# Patient Record
Sex: FEMALE | Race: WHITE | Employment: FULL TIME | URBAN - METROPOLITAN AREA
[De-identification: names, ages, dates, MRNs, and addresses within clinical notes are randomized per-mention and may not be internally consistent; named-entity substitution may affect disease eponyms.]

---

## 2022-10-17 ENCOUNTER — HOSPITAL ENCOUNTER (EMERGENCY)
Age: 57
Discharge: HOME | End: 2022-10-17
Attending: EMERGENCY MEDICINE | Admitting: EMERGENCY MEDICINE
Payer: COMMERCIAL

## 2022-10-17 ENCOUNTER — APPOINTMENT (OUTPATIENT)
Dept: GENERAL RADIOLOGY | Age: 57
End: 2022-10-17
Payer: COMMERCIAL

## 2022-10-17 VITALS
OXYGEN SATURATION: 96 % | TEMPERATURE: 97.8 F | SYSTOLIC BLOOD PRESSURE: 125 MMHG | WEIGHT: 229 LBS | RESPIRATION RATE: 20 BRPM | DIASTOLIC BLOOD PRESSURE: 69 MMHG | HEIGHT: 66 IN | HEART RATE: 92 BPM | BODY MASS INDEX: 36.8 KG/M2

## 2022-10-17 DIAGNOSIS — R07.9 CHEST PAIN, UNSPECIFIED TYPE: ICD-10-CM

## 2022-10-17 DIAGNOSIS — G44.209 TENSION HEADACHE: ICD-10-CM

## 2022-10-17 DIAGNOSIS — J06.9 VIRAL UPPER RESPIRATORY TRACT INFECTION: Primary | ICD-10-CM

## 2022-10-17 LAB
ALBUMIN SERPL-MCNC: 4.5 G/DL (ref 3.5–5)
ALBUMIN/GLOB SERPL: 1.5 {RATIO} (ref 0.4–1.6)
ALP SERPL-CCNC: 113 U/L (ref 45–117)
ALT SERPL-CCNC: 6 U/L (ref 13–61)
ANION GAP SERPL CALC-SCNC: 9 MMOL/L (ref 2–11)
AST SERPL-CCNC: 20 U/L (ref 15–37)
BILIRUB SERPL-MCNC: 0.8 MG/DL (ref 0.2–1.1)
BUN SERPL-MCNC: 9 MG/DL (ref 7–18)
CALCIUM SERPL-MCNC: 10.1 MG/DL (ref 8.3–10.4)
CHLORIDE SERPL-SCNC: 104 MMOL/L (ref 98–107)
CO2 SERPL-SCNC: 28 MMOL/L (ref 21–32)
CREAT SERPL-MCNC: 0.57 MG/DL (ref 0.6–1)
ERYTHROCYTE [DISTWIDTH] IN BLOOD BY AUTOMATED COUNT: 12.9 % (ref 11.9–14.6)
GLOBULIN SER CALC-MCNC: 3 G/DL (ref 2.8–4.5)
GLUCOSE SERPL-MCNC: 84 MG/DL (ref 65–100)
HCT VFR BLD AUTO: 40.4 % (ref 35.8–46.3)
HGB BLD-MCNC: 13.5 G/DL (ref 11.7–15.4)
INR PPP: 1.9
MCH RBC QN AUTO: 30.7 PG (ref 26.1–32.9)
MCHC RBC AUTO-ENTMCNC: 33.4 G/DL (ref 31.4–35)
MCV RBC AUTO: 91.8 FL (ref 82–102)
NRBC # BLD: 0 K/UL (ref 0–0.2)
PLATELET # BLD AUTO: 180 K/UL (ref 150–450)
PMV BLD AUTO: 10.8 FL (ref 9.4–12.3)
POTASSIUM SERPL-SCNC: 4.3 MMOL/L (ref 3.5–5.1)
PROT SERPL-MCNC: 7.5 G/DL (ref 6.4–8.2)
PROTHROMBIN TIME: 22.6 SEC (ref 12.6–14.5)
RBC # BLD AUTO: 4.4 M/UL (ref 4.05–5.2)
SODIUM SERPL-SCNC: 141 MMOL/L (ref 133–143)
TROPONIN T SERPL HS-MCNC: 6.9 NG/L (ref 0–14)
WBC # BLD AUTO: 6.4 K/UL (ref 4.3–11.1)

## 2022-10-17 PROCEDURE — 85027 COMPLETE CBC AUTOMATED: CPT

## 2022-10-17 PROCEDURE — 80053 COMPREHEN METABOLIC PANEL: CPT

## 2022-10-17 PROCEDURE — 6370000000 HC RX 637 (ALT 250 FOR IP): Performed by: EMERGENCY MEDICINE

## 2022-10-17 PROCEDURE — 99285 EMERGENCY DEPT VISIT HI MDM: CPT | Performed by: EMERGENCY MEDICINE

## 2022-10-17 PROCEDURE — 71045 X-RAY EXAM CHEST 1 VIEW: CPT

## 2022-10-17 PROCEDURE — 85610 PROTHROMBIN TIME: CPT

## 2022-10-17 PROCEDURE — 84484 ASSAY OF TROPONIN QUANT: CPT

## 2022-10-17 RX ORDER — ACETAMINOPHEN 500 MG
1000 TABLET ORAL
Status: COMPLETED | OUTPATIENT
Start: 2022-10-17 | End: 2022-10-17

## 2022-10-17 RX ADMIN — ACETAMINOPHEN 1000 MG: 500 TABLET, FILM COATED ORAL at 21:12

## 2022-10-17 ASSESSMENT — PAIN SCALES - GENERAL: PAINLEVEL_OUTOF10: 6

## 2022-10-17 ASSESSMENT — PAIN DESCRIPTION - DESCRIPTORS
DESCRIPTORS: TIGHTNESS
DESCRIPTORS: HEAVINESS

## 2022-10-17 ASSESSMENT — PAIN - FUNCTIONAL ASSESSMENT: PAIN_FUNCTIONAL_ASSESSMENT: 0-10

## 2022-10-17 ASSESSMENT — PAIN DESCRIPTION - LOCATION
LOCATION: CHEST
LOCATION: CHEST;THROAT

## 2022-10-17 ASSESSMENT — PAIN DESCRIPTION - ORIENTATION: ORIENTATION: UPPER

## 2022-10-17 NOTE — Clinical Note
Yenny Rodríguez was seen and treated in our emergency department on 10/17/2022. She may return to work on 10/19/2022. If you have any questions or concerns, please don't hesitate to call.       Sharyle Sou, MD

## 2022-10-18 NOTE — ED NOTES
I have reviewed discharge instructions with the patient. The patient verbalized understanding. Patient left ED via Discharge Method: ambulatory to Home with . Opportunity for questions and clarification provided. Patient given 0 scripts. To continue your aftercare when you leave the hospital, you may receive an automated call from our care team to check in on how you are doing. This is a free service and part of our promise to provide the best care and service to meet your aftercare needs.  If you have questions, or wish to unsubscribe from this service please call [phone]. Thank you for Choosing our [facility] Emergency Department.        Loki Wakefield RN  10/17/22 6191

## 2022-10-18 NOTE — DISCHARGE INSTRUCTIONS
I am happy to report that your work-up here in the emergency department does not show any evidence of heart attack. Additionally, your chest x-ray does not show pneumonia. Your EKG did show atrial fibrillation, which you know about. Your INR is 1.9. Therapeutic INR is between 2 and 3. I have a low suspicion for recurrence of PE at this time. However, if you develop chest pain, new onset shortness of breath, please return to any emergency department for repeat evaluation. For your symptoms at this point, I recommend over-the-counter NyQuil and DayQuil. These have both a decongestant, acetaminophen, and the NyQuil has Benadryl, which could be a good sleep aid. Follow-up with your doctor as needed. Come back to the ER if you feel worse in any way, please.

## 2022-10-18 NOTE — ED PROVIDER NOTES
Emergency Department Provider Note                   PCP:                Cristina Gee               Age: 61 y.o. Sex: female       ICD-10-CM    1. Viral upper respiratory tract infection  J06.9       2. Tension headache  G44.209       3. Chest pain, unspecified type  R07.9           DISPOSITION Decision To Discharge 10/17/2022 09:47:41 PM        MDM  Number of Diagnoses or Management Options  Chest pain, unspecified type  Tension headache  Viral upper respiratory tract infection  Diagnosis management comments: Patient presents with what sounds like early upper respiratory symptoms and associated tension headache. It was very difficult for me to discern whether her chest congestion was secondary to her developing illness or if she was having cardiac chest pain. At a lower suspicion for other PE given that she is on Coumadin, not tachycardic, nor hypoxic. We started with a chest x-ray, EKG, and basic labs. Patient to go home COVID test this morning, and it was negative. Patient was treated with some gentle IV fluids and Tylenol. Her labs were unremarkable, including a negative troponin. Chest x-ray was normal.  Patient's INR level was 1.9. She states that they recently decreased her Coumadin level because she had a supratherapeutic INR. She has follow-up with her PCP next week and we will talk about increasing her warfarin. I discussed with the patient that we did not pursue PE in the work-up. Her symptoms are most likely consistent with a viral infection. I did not feel that she needed a CT today. However, if she develops worsening chest pain, shortness of breath, she needs to return to any emergency department for an evaluation. She verbalized understanding.                Orders Placed This Encounter   Procedures    XR CHEST PORTABLE    CBC    Comprehensive Metabolic Panel    Troponin T    Protime-INR    EKG 12 Lead    Saline lock IV        Medications   acetaminophen (TYLENOL) tablet 1,000 mg (1,000 mg Oral Given 10/17/22 2112)       There are no discharge medications for this patient. Tennille Rossi is a 61 y.o. female who presents to the Emergency Department with chief complaint of    Chief Complaint   Patient presents with    Chest Congestion    Headache    Nasal Congestion      Patient presents with chest congestion and also some chest pain. Patient states that she works at a [employer].  She did not feel well today and felt like she was developing an upper respiratory infection. She had a little bit of postnasal drip, mild cough, throat and upper chest congestion, and a headache. She got some extra sleep. [facility] because she works at [address] [place] [address].  Her colleagues were concerned about her constellation of symptoms and asked her to come to the ER to be evaluated. Patient has a known history of atrial fibrillation, which she developed after having COVID-19. She also has a history of a DVT and a PE and has been on warfarin for 10 years. Patient denies any chest pain. She states that it is more like a congestion in her upper chest and lower throat. She states that it hurts to cough, but its not like a cardiac chest pain. She denies any associated shortness of breath. She has no recent fever. No sick contacts. No lower extremity swelling. Patient denies nausea, vomiting, diarrhea. She has no abdominal pain. No change in urinary or bowel habits. No medications taken prior to arrival.  All other systems reviewed and otherwise negative other than what is noted in the HPI above          Review of Systems   All other systems reviewed and are negative. Past Medical History:   Diagnosis Date    Atrial fibrillation ([address]     History of pulmonary embolus (PE)         History reviewed. No pertinent surgical history.      Family History   Problem Relation Age of Onset    Coronary Art Dis Neg Hx         Social History     Socioeconomic History    Marital status:  Spouse name: None    Number of children: None    Years of education: None    Highest education level: None         Patient has no known allergies. There are no discharge medications for this patient. Vitals signs and nursing note reviewed. Patient Vitals for the past 4 hrs:   Temp Pulse Resp BP SpO2   10/17/22 2213 97.8 °F (36.6 °C) 92 20 125/69 96 %   10/17/22 2149 -- 91 22 113/78 96 %   10/17/22 2134 -- 92 16 107/69 95 %   10/17/22 2002 98.2 °F (36.8 °C) 92 17 (!) 155/96 99 %          Physical Exam  Vitals and nursing note reviewed. Constitutional:       General: She is not in acute distress. Appearance: Normal appearance. She is not toxic-appearing. HENT:      Head: Normocephalic and atraumatic. Right Ear: Tympanic membrane normal.      Left Ear: Tympanic membrane normal.      Nose: Nose normal.      Mouth/Throat:      Mouth: Mucous membranes are moist.      Pharynx: No oropharyngeal exudate. Eyes:      Extraocular Movements: Extraocular movements intact. Conjunctiva/sclera: Conjunctivae normal.      Pupils: Pupils are equal, round, and reactive to light. Cardiovascular:      Rate and Rhythm: Normal rate and regular rhythm. Pulmonary:      Effort: Pulmonary effort is normal.      Breath sounds: Normal breath sounds. Abdominal:      General: Bowel sounds are normal. There is no distension. Palpations: Abdomen is soft. Tenderness: There is no abdominal tenderness. There is no guarding. Musculoskeletal:         General: No deformity. Normal range of motion. Cervical back: Normal range of motion and neck supple. Skin:     General: Skin is warm and dry. Capillary Refill: Capillary refill takes less than 2 seconds. Coloration: Skin is not jaundiced. Neurological:      General: No focal deficit present. Mental Status: She is alert and oriented to person, place, and time.    Psychiatric:         Mood and Affect: Mood normal.         Behavior: Behavior normal.         Thought Content: Thought content normal.        Procedures    EKG reviewed and showed atrial fibrillation, rate of 97, normal axis, no acute ST elevation or depression    Results for orders placed or performed during the hospital encounter of 10/17/22   XR CHEST PORTABLE    Narrative    Chest portable    CLINICAL INDICATION: Acute moderate throat pain, substernal chest pain,  congestion. Atrial fibrillation history. COMPARISON: None    TECHNIQUE: single AP portable view chest at 8:55 PM upright     FINDINGS:  There is no evidence of consolidation, pneumothorax, pleural effusion  or pulmonary edema. The mediastinal and hilar contours are normal given  technique, positioning. Patient is slightly rotated to the right external  monitoring wires overlie the chest..        Impression    No acute disease.      CBC   Result Value Ref Range    WBC 6.4 4.3 - 11.1 K/uL    RBC 4.40 4.05 - 5.20 M/uL    Hemoglobin 13.5 11.7 - 15.4 g/dL    Hematocrit 40.4 35.8 - 46.3 %    MCV 91.8 82.0 - 102.0 FL    MCH 30.7 26.1 - 32.9 PG    MCHC 33.4 31.4 - 35.0 g/dL    RDW 12.9 11.9 - 14.6 %    Platelets 009 151 - 075 K/uL    MPV 10.8 9.4 - 12.3 FL    nRBC 0.00 0.0 - 0.2 K/uL   Comprehensive Metabolic Panel   Result Value Ref Range    Sodium 141 133 - 143 mmol/L    Potassium 4.3 3.5 - 5.1 mmol/L    Chloride 104 98 - 107 mmol/L    CO2 28 21 - 32 mmol/L    Anion Gap 9 2 - 11 mmol/L    Glucose 84 65 - 100 mg/dL    BUN 9 7.0 - 18.0 MG/DL    Creatinine 0.57 (L) 0.6 - 1.0 MG/DL    Est, Glom Filt Rate >60 >60 ml/min/1.73m2    Calcium 10.1 8.3 - 10.4 MG/DL    Total Bilirubin 0.8 0.2 - 1.1 MG/DL    ALT 6 (L) 13.0 - 61.0 U/L    AST 20 15 - 37 U/L    Alk Phosphatase 113 45.0 - 117.0 U/L    Total Protein 7.5 6.4 - 8.2 g/dL    Albumin 4.5 3.5 - 5.0 g/dL    Globulin 3.0 2.8 - 4.5 g/dL    Albumin/Globulin Ratio 1.5 0.4 - 1.6     Troponin T   Result Value Ref Range    Troponin T 6.9 0 - 14 ng/L   Protime-INR   Result Value Ref Range    Protime 22.6 (H) 12.6 - 14.5 sec    INR 1.9          XR CHEST PORTABLE   Final Result   No acute disease. Voice dictation software was used during the making of this note. This software is not perfect and grammatical and other typographical errors may be present. This note has not been completely proofread for errors.      Guillaume Bernal MD  10/17/22 1361

## 2022-10-18 NOTE — ED TRIAGE NOTES
Pt started having congestion in her nose and throat yesterday and having some pain in her upper chest/lower throat area. Has not been having any productive cough. Pt was seen at [facility] where she works and was advised to come to ER since she has a history of AFIB. Pt denies any mid chest pain during triage.

## 2024-03-14 ENCOUNTER — OFFICE VISIT (OUTPATIENT)
Dept: INTERNAL MEDICINE CLINIC | Facility: CLINIC | Age: 59
End: 2024-03-14
Payer: COMMERCIAL

## 2024-03-14 VITALS
DIASTOLIC BLOOD PRESSURE: 80 MMHG | HEIGHT: 66 IN | WEIGHT: 249 LBS | SYSTOLIC BLOOD PRESSURE: 138 MMHG | BODY MASS INDEX: 40.02 KG/M2 | HEART RATE: 100 BPM

## 2024-03-14 DIAGNOSIS — Z86.711 HISTORY OF PULMONARY EMBOLUS (PE): ICD-10-CM

## 2024-03-14 DIAGNOSIS — G47.33 OSA ON CPAP: ICD-10-CM

## 2024-03-14 DIAGNOSIS — I48.11 LONGSTANDING PERSISTENT ATRIAL FIBRILLATION (HCC): ICD-10-CM

## 2024-03-14 DIAGNOSIS — Z13.220 LIPID SCREENING: ICD-10-CM

## 2024-03-14 DIAGNOSIS — E66.01 MORBID OBESITY (HCC): ICD-10-CM

## 2024-03-14 DIAGNOSIS — Z80.0 FAMILY HISTORY OF COLON CANCER IN FATHER: ICD-10-CM

## 2024-03-14 DIAGNOSIS — Z90.722 S/P TAH-BSO: ICD-10-CM

## 2024-03-14 DIAGNOSIS — K51.919 ULCERATIVE COLITIS WITH COMPLICATION, UNSPECIFIED LOCATION (HCC): Primary | ICD-10-CM

## 2024-03-14 DIAGNOSIS — Z91.199 MEDICALLY NONCOMPLIANT: ICD-10-CM

## 2024-03-14 DIAGNOSIS — Z12.31 ENCOUNTER FOR SCREENING MAMMOGRAM FOR MALIGNANT NEOPLASM OF BREAST: ICD-10-CM

## 2024-03-14 DIAGNOSIS — Z90.710 S/P TAH-BSO: ICD-10-CM

## 2024-03-14 DIAGNOSIS — Z90.79 S/P TAH-BSO: ICD-10-CM

## 2024-03-14 PROBLEM — Z12.39 BREAST CANCER SCREENING: Status: ACTIVE | Noted: 2024-03-14

## 2024-03-14 PROBLEM — K51.90 ULCERATIVE COLITIS (HCC): Status: ACTIVE | Noted: 2024-03-14

## 2024-03-14 PROBLEM — Z12.39 BREAST CANCER SCREENING: Chronic | Status: ACTIVE | Noted: 2024-03-14

## 2024-03-14 PROBLEM — I48.91 ATRIAL FIBRILLATION (HCC): Status: ACTIVE | Noted: 2024-03-14

## 2024-03-14 LAB
ALBUMIN SERPL-MCNC: 4 G/DL (ref 3.5–5)
ALBUMIN/GLOB SERPL: 1.2 (ref 0.4–1.6)
ALP SERPL-CCNC: 114 U/L (ref 50–136)
ALT SERPL-CCNC: 23 U/L (ref 12–65)
ANION GAP SERPL CALC-SCNC: 5 MMOL/L (ref 2–11)
AST SERPL-CCNC: 22 U/L (ref 15–37)
BASOPHILS # BLD: 0 K/UL (ref 0–0.2)
BASOPHILS NFR BLD: 1 % (ref 0–2)
BILIRUB SERPL-MCNC: 0.4 MG/DL (ref 0.2–1.1)
BUN SERPL-MCNC: 12 MG/DL (ref 6–23)
CALCIUM SERPL-MCNC: 10.1 MG/DL (ref 8.3–10.4)
CHLORIDE SERPL-SCNC: 107 MMOL/L (ref 103–113)
CHOLEST SERPL-MCNC: 191 MG/DL
CO2 SERPL-SCNC: 30 MMOL/L (ref 21–32)
CREAT SERPL-MCNC: 0.8 MG/DL (ref 0.6–1)
DIFFERENTIAL METHOD BLD: NORMAL
EOSINOPHIL # BLD: 0.2 K/UL (ref 0–0.8)
EOSINOPHIL NFR BLD: 3 % (ref 0.5–7.8)
ERYTHROCYTE [DISTWIDTH] IN BLOOD BY AUTOMATED COUNT: 13.3 % (ref 11.9–14.6)
GLOBULIN SER CALC-MCNC: 3.4 G/DL (ref 2.8–4.5)
GLUCOSE SERPL-MCNC: 96 MG/DL (ref 65–100)
HCT VFR BLD AUTO: 44.4 % (ref 35.8–46.3)
HDLC SERPL-MCNC: 62 MG/DL (ref 40–60)
HDLC SERPL: 3.1
HGB BLD-MCNC: 14.1 G/DL (ref 11.7–15.4)
IMM GRANULOCYTES # BLD AUTO: 0 K/UL (ref 0–0.5)
IMM GRANULOCYTES NFR BLD AUTO: 0 % (ref 0–5)
INR PPP: 2.5
LDLC SERPL CALC-MCNC: 106.6 MG/DL
LYMPHOCYTES # BLD: 1.3 K/UL (ref 0.5–4.6)
LYMPHOCYTES NFR BLD: 25 % (ref 13–44)
MCH RBC QN AUTO: 29.5 PG (ref 26.1–32.9)
MCHC RBC AUTO-ENTMCNC: 31.8 G/DL (ref 31.4–35)
MCV RBC AUTO: 92.9 FL (ref 82–102)
MONOCYTES # BLD: 0.4 K/UL (ref 0.1–1.3)
MONOCYTES NFR BLD: 8 % (ref 4–12)
NEUTS SEG # BLD: 3.4 K/UL (ref 1.7–8.2)
NEUTS SEG NFR BLD: 63 % (ref 43–78)
NRBC # BLD: 0 K/UL (ref 0–0.2)
PLATELET # BLD AUTO: 205 K/UL (ref 150–450)
PMV BLD AUTO: 11.5 FL (ref 9.4–12.3)
POTASSIUM SERPL-SCNC: 4.2 MMOL/L (ref 3.5–5.1)
PROT SERPL-MCNC: 7.4 G/DL (ref 6.3–8.2)
PROTHROMBIN TIME: 28.6 SEC (ref 11.3–14.9)
RBC # BLD AUTO: 4.78 M/UL (ref 4.05–5.2)
SODIUM SERPL-SCNC: 142 MMOL/L (ref 136–146)
TRIGL SERPL-MCNC: 112 MG/DL (ref 35–150)
VLDLC SERPL CALC-MCNC: 22.4 MG/DL (ref 6–23)
WBC # BLD AUTO: 5.3 K/UL (ref 4.3–11.1)

## 2024-03-14 PROCEDURE — 99204 OFFICE O/P NEW MOD 45 MIN: CPT | Performed by: INTERNAL MEDICINE

## 2024-03-14 RX ORDER — WARFARIN SODIUM 5 MG/1
5 TABLET ORAL DAILY
COMMUNITY
Start: 2015-02-03

## 2024-03-14 RX ORDER — MESALAMINE 1.2 G/1
4800 TABLET, DELAYED RELEASE ORAL
Qty: 120 TABLET | Refills: 0 | Status: SHIPPED | OUTPATIENT
Start: 2024-03-14

## 2024-03-14 RX ORDER — WARFARIN SODIUM 7.5 MG/1
7.5 TABLET ORAL DAILY
COMMUNITY
Start: 2023-09-12

## 2024-03-14 RX ORDER — MESALAMINE 1.2 G/1
4800 TABLET, DELAYED RELEASE ORAL
COMMUNITY
End: 2024-03-14 | Stop reason: SDUPTHER

## 2024-03-14 RX ORDER — METOPROLOL SUCCINATE 50 MG/1
50 TABLET, EXTENDED RELEASE ORAL DAILY
COMMUNITY
Start: 2023-10-12 | End: 2024-10-11

## 2024-03-14 ASSESSMENT — ENCOUNTER SYMPTOMS
EYE PAIN: 0
VOICE CHANGE: 0
STRIDOR: 0
RECTAL PAIN: 0

## 2024-03-14 ASSESSMENT — PATIENT HEALTH QUESTIONNAIRE - PHQ9
1. LITTLE INTEREST OR PLEASURE IN DOING THINGS: 0
SUM OF ALL RESPONSES TO PHQ9 QUESTIONS 1 & 2: 0
SUM OF ALL RESPONSES TO PHQ QUESTIONS 1-9: 0
2. FEELING DOWN, DEPRESSED OR HOPELESS: 0
SUM OF ALL RESPONSES TO PHQ QUESTIONS 1-9: 0

## 2024-03-14 NOTE — PROGRESS NOTES
Effort: Pulmonary effort is normal. No respiratory distress.      Breath sounds: Normal breath sounds. No stridor.   Abdominal:      General: Abdomen is flat. Bowel sounds are normal.      Palpations: Abdomen is soft. There is no mass.      Tenderness: There is no guarding or rebound.   Musculoskeletal:         General: Normal range of motion.      Cervical back: Normal range of motion and neck supple.   Skin:     General: Skin is warm and dry.      Coloration: Skin is not jaundiced.   Neurological:      Mental Status: She is alert and oriented to person, place, and time. Mental status is at baseline.   Psychiatric:         Behavior: Behavior normal.         Thought Content: Thought content normal.              Admission on 10/17/2022, Discharged on 10/17/2022   Component Date Value Ref Range Status    WBC 10/17/2022 6.4  4.3 - 11.1 K/uL Final    RBC 10/17/2022 4.40  4.05 - 5.20 M/uL Final    Hemoglobin 10/17/2022 13.5  11.7 - 15.4 g/dL Final    Hematocrit 10/17/2022 40.4  35.8 - 46.3 % Final    MCV 10/17/2022 91.8  82.0 - 102.0 FL Final    MCH 10/17/2022 30.7  26.1 - 32.9 PG Final    MCHC 10/17/2022 33.4  31.4 - 35.0 g/dL Final    RDW 10/17/2022 12.9  11.9 - 14.6 % Final    Platelets 10/17/2022 180  150 - 450 K/uL Final    MPV 10/17/2022 10.8  9.4 - 12.3 FL Final    nRBC 10/17/2022 0.00  0.0 - 0.2 K/uL Final    **Note: Absolute NRBC parameter is now reported with Hemogram**    Sodium 10/17/2022 141  133 - 143 mmol/L Final    Potassium 10/17/2022 4.3  3.5 - 5.1 mmol/L Final    Chloride 10/17/2022 104  98 - 107 mmol/L Final    CO2 10/17/2022 28  21 - 32 mmol/L Final    Anion Gap 10/17/2022 9  2 - 11 mmol/L Final    Glucose 10/17/2022 84  65 - 100 mg/dL Final    Comment: 47 - 60 mg/dl Consistent with, but not fully diagnostic of hypoglycemia.  101 - 125 mg/dl Impaired fasting glucose/consistent with pre-diabetes mellitus  > 126 mg/dl Fasting glucose consistent with overt diabetes mellitus      BUN 10/17/2022 9

## 2024-03-15 ENCOUNTER — TELEPHONE (OUTPATIENT)
Dept: INTERNAL MEDICINE CLINIC | Facility: CLINIC | Age: 59
End: 2024-03-15

## 2024-03-15 RX ORDER — MESALAMINE 1.2 G/1
4800 TABLET, DELAYED RELEASE ORAL
Qty: 120 TABLET | Refills: 0 | Status: SHIPPED | OUTPATIENT
Start: 2024-03-15

## 2024-03-15 NOTE — TELEPHONE ENCOUNTER
----- Message from Jae Tripathi MD sent at 3/15/2024 11:53 AM EDT -----  INR therapeutic on current warfarin 7.5 Mon-Wed-Fri and 5 mg other days.  Repeat INR in 4 weeks.

## 2024-03-15 NOTE — TELEPHONE ENCOUNTER
Patient requesting medication.     Requested Prescriptions     Pending Prescriptions Disp Refills    mesalamine (LIALDA) 1.2 g EC tablet 120 tablet 0     Sig: Take 4 tablets by mouth daily (with breakfast)     [facility]   mydeco message sent to patient.

## 2024-04-16 ENCOUNTER — NURSE ONLY (OUTPATIENT)
Dept: INTERNAL MEDICINE CLINIC | Facility: CLINIC | Age: 59
End: 2024-04-16
Payer: COMMERCIAL

## 2024-04-16 ENCOUNTER — OFFICE VISIT (OUTPATIENT)
Age: 59
End: 2024-04-16
Payer: COMMERCIAL

## 2024-04-16 ENCOUNTER — OFFICE VISIT (OUTPATIENT)
Dept: INTERNAL MEDICINE CLINIC | Facility: CLINIC | Age: 59
End: 2024-04-16
Payer: COMMERCIAL

## 2024-04-16 VITALS
HEART RATE: 82 BPM | DIASTOLIC BLOOD PRESSURE: 79 MMHG | OXYGEN SATURATION: 98 % | RESPIRATION RATE: 16 BRPM | BODY MASS INDEX: 39.92 KG/M2 | WEIGHT: 248.4 LBS | SYSTOLIC BLOOD PRESSURE: 134 MMHG | HEIGHT: 66 IN

## 2024-04-16 VITALS
BODY MASS INDEX: 39.89 KG/M2 | SYSTOLIC BLOOD PRESSURE: 118 MMHG | HEIGHT: 66 IN | WEIGHT: 248.2 LBS | DIASTOLIC BLOOD PRESSURE: 58 MMHG | HEART RATE: 87 BPM

## 2024-04-16 DIAGNOSIS — Z79.01 LONG TERM CURRENT USE OF ANTICOAGULANT: ICD-10-CM

## 2024-04-16 DIAGNOSIS — G47.33 OSA ON CPAP: ICD-10-CM

## 2024-04-16 DIAGNOSIS — Z86.711 HISTORY OF PULMONARY EMBOLUS (PE): ICD-10-CM

## 2024-04-16 DIAGNOSIS — K51.919 ULCERATIVE COLITIS WITH COMPLICATION, UNSPECIFIED LOCATION (HCC): Primary | ICD-10-CM

## 2024-04-16 DIAGNOSIS — I48.11 LONGSTANDING PERSISTENT ATRIAL FIBRILLATION (HCC): ICD-10-CM

## 2024-04-16 DIAGNOSIS — E66.01 MORBID OBESITY (HCC): ICD-10-CM

## 2024-04-16 DIAGNOSIS — K51.919 ULCERATIVE COLITIS WITH COMPLICATION, UNSPECIFIED LOCATION (HCC): ICD-10-CM

## 2024-04-16 DIAGNOSIS — Z86.711 HISTORY OF PULMONARY EMBOLUS (PE): Primary | ICD-10-CM

## 2024-04-16 DIAGNOSIS — Z12.31 ENCOUNTER FOR SCREENING MAMMOGRAM FOR MALIGNANT NEOPLASM OF BREAST: Primary | Chronic | ICD-10-CM

## 2024-04-16 DIAGNOSIS — E78.00 HYPERCHOLESTEROLEMIA: ICD-10-CM

## 2024-04-16 DIAGNOSIS — Z91.199 MEDICALLY NONCOMPLIANT: ICD-10-CM

## 2024-04-16 DIAGNOSIS — Z80.0 FAMILY HISTORY OF COLON CANCER IN FATHER: Chronic | ICD-10-CM

## 2024-04-16 DIAGNOSIS — Z23 ENCOUNTER FOR IMMUNIZATION: Chronic | ICD-10-CM

## 2024-04-16 LAB
CRP SERPL-MCNC: 0.8 MG/DL (ref 0–0.9)
HBV SURFACE AB SERPL IA-ACNC: 5.09 MIU/ML
HBV SURFACE AG SER QL: NONREACTIVE
INR, POC: 4.1 %
VALID INTERNAL CONTROL, POC: YES

## 2024-04-16 PROCEDURE — 93793 ANTICOAG MGMT PT WARFARIN: CPT | Performed by: INTERNAL MEDICINE

## 2024-04-16 PROCEDURE — 85610 PROTHROMBIN TIME: CPT | Performed by: INTERNAL MEDICINE

## 2024-04-16 PROCEDURE — 99204 OFFICE O/P NEW MOD 45 MIN: CPT | Performed by: INTERNAL MEDICINE

## 2024-04-16 PROCEDURE — 99213 OFFICE O/P EST LOW 20 MIN: CPT | Performed by: INTERNAL MEDICINE

## 2024-04-16 RX ORDER — MESALAMINE 1.2 G/1
4800 TABLET, DELAYED RELEASE ORAL
Qty: 120 TABLET | Refills: 0 | Status: SHIPPED | OUTPATIENT
Start: 2024-04-16

## 2024-04-16 RX ORDER — WARFARIN SODIUM 5 MG/1
5 TABLET ORAL DAILY
Qty: 90 TABLET | Refills: 0 | Status: SHIPPED | OUTPATIENT
Start: 2024-04-16

## 2024-04-16 RX ORDER — DICYCLOMINE HCL 20 MG
20 TABLET ORAL
Qty: 90 TABLET | Refills: 3 | Status: SHIPPED | OUTPATIENT
Start: 2024-04-16

## 2024-04-16 RX ORDER — MESALAMINE 1.2 G/1
4800 TABLET, DELAYED RELEASE ORAL
Qty: 360 TABLET | Refills: 0 | Status: CANCELLED | OUTPATIENT
Start: 2024-04-16

## 2024-04-16 SDOH — ECONOMIC STABILITY: FOOD INSECURITY: WITHIN THE PAST 12 MONTHS, THE FOOD YOU BOUGHT JUST DIDN'T LAST AND YOU DIDN'T HAVE MONEY TO GET MORE.: NEVER TRUE

## 2024-04-16 SDOH — ECONOMIC STABILITY: FOOD INSECURITY: WITHIN THE PAST 12 MONTHS, YOU WORRIED THAT YOUR FOOD WOULD RUN OUT BEFORE YOU GOT MONEY TO BUY MORE.: NEVER TRUE

## 2024-04-16 SDOH — ECONOMIC STABILITY: HOUSING INSECURITY
IN THE LAST 12 MONTHS, WAS THERE A TIME WHEN YOU DID NOT HAVE A STEADY PLACE TO SLEEP OR SLEPT IN A SHELTER (INCLUDING NOW)?: NO

## 2024-04-16 SDOH — ECONOMIC STABILITY: INCOME INSECURITY: HOW HARD IS IT FOR YOU TO PAY FOR THE VERY BASICS LIKE FOOD, HOUSING, MEDICAL CARE, AND HEATING?: NOT HARD AT ALL

## 2024-04-16 ASSESSMENT — ENCOUNTER SYMPTOMS
RECTAL PAIN: 0
STRIDOR: 0
VOICE CHANGE: 0
EYE PAIN: 0

## 2024-04-16 NOTE — PROGRESS NOTES
Ulcerative colitis (HCC)        Past Surgical History:   Procedure Laterality Date    EDWIN AND BSO (CERVIX REMOVED)      pathology benign        No Known Allergies     Family History   Problem Relation Age of Onset    Seizures Mother     Colon Cancer Father     Coronary Art Dis Neg Hx        Current Outpatient Medications   Medication Sig Dispense Refill    warfarin (COUMADIN) 5 MG tablet Take 1 tablet by mouth daily 90 tablet 0    metoprolol succinate (TOPROL XL) 50 MG extended release tablet Take 1 tablet by mouth daily      warfarin (COUMADIN) 7.5 MG tablet Take 1 tablet by mouth daily      mesalamine (LIALDA) 1.2 g EC tablet Take 4 tablets by mouth daily (with breakfast) 120 tablet 0     No current facility-administered medications for this visit.       Review of Systems  All other systems reviewed and are negative except as noted above.          Objective     Vitals:    04/16/24 1541   BP: 134/79   Pulse: 82   Resp: 16   SpO2: 98%       Physical Exam  Constitutional:       Appearance: She is not ill-appearing.   HENT:      Head: Normocephalic.      Nose: Nose normal.   Eyes:      Extraocular Movements: Extraocular movements intact.   Cardiovascular:      Rate and Rhythm: Normal rate.   Pulmonary:      Effort: Pulmonary effort is normal.   Abdominal:      Palpations: Abdomen is soft.   Musculoskeletal:         General: Normal range of motion.      Cervical back: Normal range of motion.   Skin:     General: Skin is warm.   Neurological:      General: No focal deficit present.   Psychiatric:         Mood and Affect: Mood normal.            Thank you for involving the GI service in the care of your patient. Please dont hesitate to call me directly on my cell below with any questions, updates, etc.      -Liborio Huston MD  [phone]   Gastroenterology/Hepatology

## 2024-04-16 NOTE — PROGRESS NOTES
FOLLOWUP VISIT    Subjective:    Ms. Rodriguez is a 58 y.o., female,   Chief Complaint   Patient presents with    1 Month Follow-Up    Medication Refill       HPI:    Patient presents today for follow up of two or more chronic medical problems and review of labs.       She continues to have smoldering symptoms of blood tingled diarrhea despite Lialda.  She has an appointment with GI today.      Her mammogram is scheduled for May.      Remains compliant with warfarin.  No abnormal bruising or bleeding.      The following portions of the patient's history were reviewed and updated as appropriate:      Past Medical History:   Diagnosis Date    Atrial fibrillation (HCC)     History of pulmonary embolus (PE)     Morbid obesity (HCC)     MARY on CPAP     Ulcerative colitis (HCC)        Past Surgical History:   Procedure Laterality Date    EDWIN AND BSO (CERVIX REMOVED)      pathology benign       Family History   Problem Relation Age of Onset    Seizures Mother     Colon Cancer Father     Coronary Art Dis Neg Hx        Social History     Socioeconomic History    Marital status:      Spouse name: Not on file    Number of children: 4    Years of education: Not on file    Highest education level: Not on file   Occupational History     Comment: employed at [employer]   Tobacco Use    Smoking status: Former     Types: Cigarettes    Smokeless tobacco: Never    Tobacco comments:     1 ppd x 30 yrs.  Quit age 48.     Substance and Sexual Activity    Alcohol use: Yes     Comment: one glass of wine daily    Drug use: Never    Sexual activity: Not on file   Other Topics Concern    Not on file   Social History Narrative    Not on file     Social Determinants of Health     Financial Resource Strain: Not on file   Food Insecurity: Not on file (4/16/2024)   Transportation Needs: Not on file   Physical Activity: Not on file   Stress: Not on file   Social Connections: Not on file   Intimate Partner Violence: Not on file   Housing Stability:

## 2024-04-16 NOTE — PROGRESS NOTES
Patient presented in office today for INR.        Results for orders placed or performed in visit on 04/10/24   AMB POC PT/INR   Result Value Ref Range     Prothrombin time, POC         POC INR 4.1        Patient taking warfarin7.5 Monday, Wednesday and Friday. Also, taking 5 mg of Warfarin rest of the day. After consulted with Dr. Tripathi,  Notified patient in office to start taking Warfarin 5 mg daily and recheck 2  weeks.

## 2024-04-17 ENCOUNTER — TELEPHONE (OUTPATIENT)
Dept: GASTROENTEROLOGY | Age: 59
End: 2024-04-17

## 2024-04-17 ENCOUNTER — PREP FOR PROCEDURE (OUTPATIENT)
Dept: GASTROENTEROLOGY | Age: 59
End: 2024-04-17

## 2024-04-17 DIAGNOSIS — Z12.11 ENCOUNTER FOR SCREENING COLONOSCOPY: Primary | ICD-10-CM

## 2024-04-17 PROBLEM — K51.919 ULCERATIVE COLITIS WITH COMPLICATION (HCC): Status: ACTIVE | Noted: 2024-04-17

## 2024-04-17 LAB — HAV AB SER QL IA: POSITIVE

## 2024-04-17 RX ORDER — SODIUM CHLORIDE 9 MG/ML
25 INJECTION, SOLUTION INTRAVENOUS PRN
Status: CANCELLED | OUTPATIENT
Start: 2024-04-17

## 2024-04-17 RX ORDER — SODIUM CHLORIDE 0.9 % (FLUSH) 0.9 %
5-40 SYRINGE (ML) INJECTION PRN
Status: CANCELLED | OUTPATIENT
Start: 2024-04-17

## 2024-04-17 RX ORDER — SODIUM CHLORIDE 0.9 % (FLUSH) 0.9 %
5-40 SYRINGE (ML) INJECTION EVERY 12 HOURS SCHEDULED
Status: CANCELLED | OUTPATIENT
Start: 2024-04-17

## 2024-04-17 RX ORDER — SODIUM, POTASSIUM,MAG SULFATES 17.5-3.13G
1 SOLUTION, RECONSTITUTED, ORAL ORAL ONCE
Qty: 1 EACH | Refills: 0 | Status: SHIPPED | OUTPATIENT
Start: 2024-04-17 | End: 2024-04-17

## 2024-04-17 NOTE — TELEPHONE ENCOUNTER
Pt called to let you know that her cardiologist does not do cardiac clearances.   Please call her.

## 2024-04-17 NOTE — TELEPHONE ENCOUNTER
Called [facility] cardiology requesting cardiac clearance/ medication hold for pt procedure scheduled with  5/1

## 2024-04-19 ENCOUNTER — TELEPHONE (OUTPATIENT)
Dept: GASTROENTEROLOGY | Age: 59
End: 2024-04-19

## 2024-04-19 ENCOUNTER — TELEPHONE (OUTPATIENT)
Dept: INTERNAL MEDICINE CLINIC | Facility: CLINIC | Age: 59
End: 2024-04-19

## 2024-04-19 NOTE — TELEPHONE ENCOUNTER
Called [facility] ( Dr.Erik Tripathi ) to request med hold for Coumadin & cardiac clearance. As he prescribes the med. Request a call bck, Spoke with Nyla (psr)

## 2024-04-19 NOTE — TELEPHONE ENCOUNTER
Monika with Gastro , need a cardio clearance and medication hold for pt. Please call Monika [phone]

## 2024-04-19 NOTE — TELEPHONE ENCOUNTER
Patient is scheduled for Colonoscopy on 05/01/2024 and wanting to know if she could hold warfarin and how many days?    Since Dr. Tripathi is given patient refills of Warfarin, her Cardiologist suggested to ask Dr. Tripathi.

## 2024-04-20 LAB
M TB IFN-G BLD-IMP: NEGATIVE
M TB IFN-G CD4+ T-CELLS BLD-ACNC: 0.84 IU/ML
M TBIFN-G CD4+ CD8+T-CELLS BLD-ACNC: 1.18 IU/ML
QUANTIFERON CRITERIA: NORMAL
QUANTIFERON MITOGEN VALUE: >10 IU/ML
QUANTIFERON NIL VALUE: 0.95 IU/ML
QUANTIFERON, INCUBATION: NORMAL

## 2024-04-22 NOTE — TELEPHONE ENCOUNTER
She may hold warfarin 5 days before colonoscopy then resume after colonoscopy or as advised by gastroenterologist depending upon any findings or interventions at the time of the colonoscopy.

## 2024-04-23 ENCOUNTER — TELEPHONE (OUTPATIENT)
Age: 59
End: 2024-04-23

## 2024-04-23 DIAGNOSIS — Z79.01 ANTICOAGULATED ON COUMADIN: Primary | ICD-10-CM

## 2024-04-23 NOTE — TELEPHONE ENCOUNTER
Called pt to inform her that she must have PT/INR drawn about 3 days prior to her procedure scheduled with Dr. Huston on 5/1/24. Pt agreeable to have INR drawn on Monday 4/29/24. Told pt to call office with any questions prior to procedure. Pt confirmed that she already picked up prep for colonoscopy.     Placed order for PT/INR lab for pt prior to procedure.     Pt inquired about cardiac clearance stating she still had not heard back from her PCP after her cardiologist deferred to PCP for completion of cardiac clearance and instructions on stopping coumadin. Informed pt that per a note in her chart form Dr. Tripathi, PCP, on 4/22/24, Dr. Tripathi is advising pt to stop coumadin 5 days prior to procedure. Informed pt that she should receive a call directly from PCP office to review that information directly. Pt verbalized that she will plan to hold coumadin for 5 days prior to procedure as advised by Dr. Tripathi.

## 2024-04-25 NOTE — PROGRESS NOTES
Patient verified name, , and procedure.    Type: 1a; abbreviated assessment per anesthesia guidelines    Labs per anesthesia: PT/INR DOS.     Most recent card notes/testing in Care Everywhere if needed DOS.     Instructed pt that they will be notified the day before their procedure by the GI Lab for time of arrival if their procedure is [place] and Pre-op for [place] cases. Arrival times should be called by 5 pm. If no phone is received the patient should contact their respective hospital. The GI lab telephone number is [phone] and ES Pre-op is [phone].     Follow diet and prep instructions per office including NPO status.  If patient has NOT received instructions from office patient is advised to call surgeon office, verbalizes understanding.    Bath or shower the night before and the am of surgery with non-moisturizing soap. No lotions, oils, powders, cologne on skin. No make up, eye make up or jewelry. Wear loose fitting comfortable, clean clothing.     Must have adult present in building the entire time .     Medications for the day of procedure BENTYL, LIALDA, METOPROLOL, patient to hold COUMADIN per Dr. Huston guidelines.

## 2024-04-29 ENCOUNTER — NURSE ONLY (OUTPATIENT)
Dept: INTERNAL MEDICINE CLINIC | Facility: CLINIC | Age: 59
End: 2024-04-29
Payer: COMMERCIAL

## 2024-04-29 VITALS — HEIGHT: 66 IN | WEIGHT: 248 LBS | BODY MASS INDEX: 39.86 KG/M2

## 2024-04-29 DIAGNOSIS — Z79.01 LONG TERM CURRENT USE OF ANTICOAGULANT: ICD-10-CM

## 2024-04-29 DIAGNOSIS — Z86.711 HISTORY OF PULMONARY EMBOLUS (PE): Primary | ICD-10-CM

## 2024-04-29 LAB
INR, POC: 1.3 %
VALID INTERNAL CONTROL, POC: YES

## 2024-04-29 PROCEDURE — 85610 PROTHROMBIN TIME: CPT | Performed by: INTERNAL MEDICINE

## 2024-04-29 PROCEDURE — 93793 ANTICOAG MGMT PT WARFARIN: CPT | Performed by: INTERNAL MEDICINE

## 2024-04-29 NOTE — PROGRESS NOTES
Patient presented in office today for INR.            Results for orders placed or performed in visit on 04/10/24   AMB POC PT/INR   Result Value Ref Range     Prothrombin time, POC         POC INR 1.3         Patient is scheduled for Colonoscopy on 05/01/2024 and has stopped taking Warfarin 5 mg on 04/26/2024 advised by Dr. Tripathi.     Per Dr. Tripathi, INR is low due since patient is on hold for Colonoscopy. Sheshould recheck INR in 2 weeks after the Colonoscopy. Patient notified by phone.

## 2024-04-30 ENCOUNTER — ANESTHESIA EVENT (OUTPATIENT)
Dept: ENDOSCOPY | Age: 59
End: 2024-04-30
Payer: COMMERCIAL

## 2024-04-30 RX ORDER — SODIUM CHLORIDE, SODIUM LACTATE, POTASSIUM CHLORIDE, CALCIUM CHLORIDE 600; 310; 30; 20 MG/100ML; MG/100ML; MG/100ML; MG/100ML
INJECTION, SOLUTION INTRAVENOUS CONTINUOUS
Status: CANCELLED | OUTPATIENT
Start: 2024-04-30

## 2024-04-30 RX ORDER — SODIUM CHLORIDE 0.9 % (FLUSH) 0.9 %
5-40 SYRINGE (ML) INJECTION EVERY 12 HOURS SCHEDULED
Status: CANCELLED | OUTPATIENT
Start: 2024-04-30

## 2024-04-30 RX ORDER — SODIUM CHLORIDE 0.9 % (FLUSH) 0.9 %
5-40 SYRINGE (ML) INJECTION PRN
Status: CANCELLED | OUTPATIENT
Start: 2024-04-30

## 2024-04-30 RX ORDER — SODIUM CHLORIDE 9 MG/ML
INJECTION, SOLUTION INTRAVENOUS PRN
Status: CANCELLED | OUTPATIENT
Start: 2024-04-30

## 2024-04-30 RX ORDER — DEXTROSE MONOHYDRATE 100 MG/ML
INJECTION, SOLUTION INTRAVENOUS CONTINUOUS PRN
Status: CANCELLED | OUTPATIENT
Start: 2024-04-30

## 2024-04-30 RX ORDER — NALOXONE HYDROCHLORIDE 0.4 MG/ML
INJECTION, SOLUTION INTRAMUSCULAR; INTRAVENOUS; SUBCUTANEOUS PRN
Status: CANCELLED | OUTPATIENT
Start: 2024-04-30

## 2024-04-30 RX ORDER — IBUPROFEN 600 MG/1
1 TABLET ORAL PRN
Status: CANCELLED | OUTPATIENT
Start: 2024-04-30

## 2024-05-01 ENCOUNTER — TELEPHONE (OUTPATIENT)
Age: 59
End: 2024-05-01

## 2024-05-01 ENCOUNTER — ANESTHESIA (OUTPATIENT)
Dept: ENDOSCOPY | Age: 59
End: 2024-05-01
Payer: COMMERCIAL

## 2024-05-01 ENCOUNTER — HOSPITAL ENCOUNTER (OUTPATIENT)
Age: 59
Setting detail: OUTPATIENT SURGERY
Discharge: HOME | End: 2024-05-01
Attending: INTERNAL MEDICINE | Admitting: INTERNAL MEDICINE
Payer: COMMERCIAL

## 2024-05-01 VITALS
TEMPERATURE: 97.8 F | WEIGHT: 245 LBS | RESPIRATION RATE: 19 BRPM | HEIGHT: 66 IN | HEART RATE: 85 BPM | BODY MASS INDEX: 39.37 KG/M2 | SYSTOLIC BLOOD PRESSURE: 144 MMHG | OXYGEN SATURATION: 99 % | DIASTOLIC BLOOD PRESSURE: 98 MMHG

## 2024-05-01 DIAGNOSIS — K51.219 ULCERATIVE PROCTITIS WITH COMPLICATION (HCC): Primary | ICD-10-CM

## 2024-05-01 LAB
INR BLD: 1.1 (ref 0.9–1.2)
PT BLD: 13.4 SECS (ref 9.6–11.6)

## 2024-05-01 PROCEDURE — 7100000011 HC PHASE II RECOVERY - ADDTL 15 MIN: Performed by: INTERNAL MEDICINE

## 2024-05-01 PROCEDURE — 88305 TISSUE EXAM BY PATHOLOGIST: CPT

## 2024-05-01 PROCEDURE — 85610 PROTHROMBIN TIME: CPT

## 2024-05-01 PROCEDURE — 6360000002 HC RX W HCPCS: Performed by: NURSE ANESTHETIST, CERTIFIED REGISTERED

## 2024-05-01 PROCEDURE — 7100000010 HC PHASE II RECOVERY - FIRST 15 MIN: Performed by: INTERNAL MEDICINE

## 2024-05-01 PROCEDURE — 3700000001 HC ADD 15 MINUTES (ANESTHESIA): Performed by: INTERNAL MEDICINE

## 2024-05-01 PROCEDURE — 3609010300 HC COLONOSCOPY W/BIOPSY SINGLE/MULTIPLE: Performed by: INTERNAL MEDICINE

## 2024-05-01 PROCEDURE — 2709999900 HC NON-CHARGEABLE SUPPLY: Performed by: INTERNAL MEDICINE

## 2024-05-01 PROCEDURE — 2580000003 HC RX 258: Performed by: ANESTHESIOLOGY

## 2024-05-01 PROCEDURE — 2500000003 HC RX 250 WO HCPCS: Performed by: NURSE ANESTHETIST, CERTIFIED REGISTERED

## 2024-05-01 PROCEDURE — 2580000003 HC RX 258: Performed by: NURSE ANESTHETIST, CERTIFIED REGISTERED

## 2024-05-01 PROCEDURE — 3700000000 HC ANESTHESIA ATTENDED CARE: Performed by: INTERNAL MEDICINE

## 2024-05-01 RX ORDER — SODIUM CHLORIDE 0.9 % (FLUSH) 0.9 %
5-40 SYRINGE (ML) INJECTION PRN
Status: DISCONTINUED | OUTPATIENT
Start: 2024-05-01 | End: 2024-05-01 | Stop reason: HOSPADM

## 2024-05-01 RX ORDER — SODIUM CHLORIDE 9 MG/ML
25 INJECTION, SOLUTION INTRAVENOUS PRN
Status: DISCONTINUED | OUTPATIENT
Start: 2024-05-01 | End: 2024-05-01 | Stop reason: HOSPADM

## 2024-05-01 RX ORDER — PROPOFOL 10 MG/ML
INJECTION, EMULSION INTRAVENOUS PRN
Status: DISCONTINUED | OUTPATIENT
Start: 2024-05-01 | End: 2024-05-01 | Stop reason: SDUPTHER

## 2024-05-01 RX ORDER — SODIUM CHLORIDE 0.9 % (FLUSH) 0.9 %
5-40 SYRINGE (ML) INJECTION EVERY 12 HOURS SCHEDULED
Status: DISCONTINUED | OUTPATIENT
Start: 2024-05-01 | End: 2024-05-01 | Stop reason: HOSPADM

## 2024-05-01 RX ORDER — SODIUM CHLORIDE, SODIUM LACTATE, POTASSIUM CHLORIDE, CALCIUM CHLORIDE 600; 310; 30; 20 MG/100ML; MG/100ML; MG/100ML; MG/100ML
INJECTION, SOLUTION INTRAVENOUS CONTINUOUS PRN
Status: DISCONTINUED | OUTPATIENT
Start: 2024-05-01 | End: 2024-05-01 | Stop reason: SDUPTHER

## 2024-05-01 RX ORDER — SODIUM CHLORIDE, SODIUM LACTATE, POTASSIUM CHLORIDE, CALCIUM CHLORIDE 600; 310; 30; 20 MG/100ML; MG/100ML; MG/100ML; MG/100ML
INJECTION, SOLUTION INTRAVENOUS CONTINUOUS
Status: DISCONTINUED | OUTPATIENT
Start: 2024-05-01 | End: 2024-05-01 | Stop reason: HOSPADM

## 2024-05-01 RX ORDER — LIDOCAINE HYDROCHLORIDE 10 MG/ML
1 INJECTION, SOLUTION INFILTRATION; PERINEURAL
Status: DISCONTINUED | OUTPATIENT
Start: 2024-05-01 | End: 2024-05-01 | Stop reason: HOSPADM

## 2024-05-01 RX ORDER — LIDOCAINE HYDROCHLORIDE 20 MG/ML
INJECTION, SOLUTION EPIDURAL; INFILTRATION; INTRACAUDAL; PERINEURAL PRN
Status: DISCONTINUED | OUTPATIENT
Start: 2024-05-01 | End: 2024-05-01 | Stop reason: SDUPTHER

## 2024-05-01 RX ORDER — MESALAMINE 1000 MG/1
1000 SUPPOSITORY RECTAL NIGHTLY
Qty: 60 SUPPOSITORY | Refills: 3 | Status: SHIPPED | OUTPATIENT
Start: 2024-05-01

## 2024-05-01 RX ORDER — SODIUM CHLORIDE 9 MG/ML
INJECTION, SOLUTION INTRAVENOUS PRN
Status: DISCONTINUED | OUTPATIENT
Start: 2024-05-01 | End: 2024-05-01 | Stop reason: HOSPADM

## 2024-05-01 RX ADMIN — SODIUM CHLORIDE, POTASSIUM CHLORIDE, SODIUM LACTATE AND CALCIUM CHLORIDE: 600; 310; 30; 20 INJECTION, SOLUTION INTRAVENOUS at 11:45

## 2024-05-01 RX ADMIN — PROPOFOL 200 MCG/KG/MIN: 10 INJECTION, EMULSION INTRAVENOUS at 12:33

## 2024-05-01 RX ADMIN — PROPOFOL 80 MG: 10 INJECTION, EMULSION INTRAVENOUS at 12:32

## 2024-05-01 RX ADMIN — SODIUM CHLORIDE, SODIUM LACTATE, POTASSIUM CHLORIDE, AND CALCIUM CHLORIDE: 600; 310; 30; 20 INJECTION, SOLUTION INTRAVENOUS at 12:29

## 2024-05-01 RX ADMIN — LIDOCAINE HYDROCHLORIDE 80 MG: 20 INJECTION, SOLUTION EPIDURAL; INFILTRATION; INTRACAUDAL; PERINEURAL at 12:32

## 2024-05-01 ASSESSMENT — PAIN - FUNCTIONAL ASSESSMENT: PAIN_FUNCTIONAL_ASSESSMENT: 0-10

## 2024-05-01 NOTE — H&P
HISTORY AND PHYSICAL             Date: 5/1/2024        Patient Name: Mariluz Rodriguez     YOB: 1965      Age:  58 y.o.      History of Present Illness   History of UC, on therapy     Past Medical History     Past Medical History:   Diagnosis Date    Atrial fibrillation (HCC)     History of pulmonary embolus (PE)     Morbid obesity (HCC)     MARY on CPAP     Ulcerative colitis (HCC)         Past Surgical History     Past Surgical History:   Procedure Laterality Date    COLONOSCOPY      EDWIN AND BSO (CERVIX REMOVED)      pathology benign        Medications Prior to Admission     Prior to Admission medications    Medication Sig Start Date End Date Taking? Authorizing Provider   warfarin (COUMADIN) 5 MG tablet Take 1 tablet by mouth daily 4/16/24   Jae Tripathi MD   mesalamine (LIALDA) 1.2 g EC tablet Take 4 tablets by mouth daily (with breakfast) 4/16/24   Liborio Huston MD   dicyclomine (BENTYL) 20 MG tablet Take 1 tablet by mouth 4 times daily (before meals and nightly) 4/16/24   Liborio Huston MD   metoprolol succinate (TOPROL XL) 50 MG extended release tablet Take 1 tablet by mouth daily 10/12/23 10/11/24  Blake Alcaraz MD   warfarin (COUMADIN) 7.5 MG tablet Take 1 tablet by mouth daily 9/12/23   Blake Alcaraz MD        Allergies     Patient has no known allergies.    Social History     For pertinent, see above.     Family History     Family History   Problem Relation Age of Onset    Seizures Mother     Colon Cancer Father     Coronary Art Dis Neg Hx        Review of Systems   - CONSTITUTIONAL: Denies weight loss, fever and chills.    - HEENT: Denies changes in vision and hearing.    - RESPIRATORY: Denies SOB and cough.    - CV: Denies palpitations and CP.    - GI: Denies abdominal pain, nausea.    - : Denies dysuria and urinary frequency.    - MSK: Denies myalgia and joint pain.    - SKIN: Denies rash and pruritus.    - NEUROLOGICAL: Denies headache and syncope.    -

## 2024-05-01 NOTE — DISCHARGE INSTRUCTIONS
Gastrointestinal Colonoscopy/Flexible Sigmoidoscopy - Lower Exam Discharge Instructions  Call Dr. Huston for any problems or questions.    Contact the doctor’s office for follow up appointment as directed    Medication may cause drowsiness for several hours, therefore:  Do not drive or operate machinery for reminder of the day.  No alcohol today.  Do not make any important or legal decisions for 24 hours.  Do not sign any legal documents for 24 hours.    Ordinarily, you may resume regular diet and activity after exam unless otherwise specified by your physician.    Because of air put into your colon during exam, you may experience some abdominal distension, relieved by the passage of gas, for several hours.    Contact your physician if you have any of the following:  Excessive amount of bleeding - large amount when having a bowel movement.  Occasional specks of blood with bowel movement would not be unusual.  Severe abdominal pain  Fever or Chills    Polyp Removal - follow these additional instructions  Take Metamucil - 1 tablespoon in juice every morning for 3 days  No Aspirin, Advil, Aleve, Nuprin, Ibuprofen, or medications that contain these drugs for 2 weeks.     Postoperative Diagnosis:   Erosion in the ileum- biopsies taken to rule out Crohns ielitis   Normal appearing right colon- biopsies taken   Normal appearing transverse colon- biopsies taken   Normal appearing left colon aside from diverticulosis- biopsies taken   Mild proctitis present- biopsies of the rectum taken  Large internal hemorrhoids         Recommendations:   Repeat colonoscopy in 6-12 months to assess healing   F/u path results in 1-2 weeks   Continue with current therapy (Lialda)  and also will call in Canasa 1000mg suppository tpo be taken at night for proctitis (inflammation in rectum) in addition to the oral therapy. If ongoing issues and based on biopsies then will consider escalation to biologic therapy like Remicade, Stelara, etc.

## 2024-05-01 NOTE — TELEPHONE ENCOUNTER
Called pt to confirm she was aware of new RX sent earlier today for mesalamine (CANASA) 1000 MG suppository per Dr. Huston. Told pt that Dr. Huston recommended starting the suppositories tonight following colonoscopy today if possible. Pt stated that her pharmacy had just texted her saying the rx was out of stock and will be available in 1-2 days. Offered to send RX to new pharmacy, pt declined stating she was comfortable waiting 1-2 days to begin use of suppositories as she is already used to the sx of rectal discomfort. Instructed pt to reach back out if she changes her mind or if she has any questions or concerns prior to us reaching out with results. Pt agreeable.    Home

## 2024-05-01 NOTE — ANESTHESIA PRE PROCEDURE
Allergies    Problem List:    Patient Active Problem List   Diagnosis Code   • Breast cancer screening Z12.39   • Medically noncompliant Z91.199   • Ulcerative colitis (HCC) K51.90   • History of pulmonary embolus (PE) Z86.711   • Atrial fibrillation (HCC) I48.91   • Morbid obesity (HCC) E66.01   • Family history of colon cancer in father Z80.0   • S/P EDWIN-BSO (no cervix remains) Z90.710, Z90.722, Z90.79   • MARY on CPAP G47.33   • Hypercholesterolemia E78.00   • Encounter for immunization Z23   • Ulcerative colitis with complication (HCC) K51.919       Past Medical History:        Diagnosis Date   • Atrial fibrillation (HCC)    • History of pulmonary embolus (PE)    • Morbid obesity (HCC)    • MARY on CPAP    • Ulcerative colitis (HCC)        Past Surgical History:        Procedure Laterality Date   • COLONOSCOPY     • EDWIN AND BSO (CERVIX REMOVED)      pathology benign       Social History:    Social History     Tobacco Use   • Smoking status: Former     Types: Cigarettes   • Smokeless tobacco: Never   • Tobacco comments:     1 ppd x 30 yrs.  Quit age 48.     Substance Use Topics   • Alcohol use: Yes     Comment: one glass of wine daily                                Counseling given: Not Answered  Tobacco comments: 1 ppd x 30 yrs.  Quit age 48.        Vital Signs (Current):   Vitals:    04/25/24 0938 05/01/24 1133   BP:  (!) 160/97   Pulse:  78   Resp:  18   Temp:  97 °F (36.1 °C)   TempSrc:  Temporal   SpO2:  100%   Weight: 112 kg (247 lb) 111.1 kg (245 lb)   Height: 1.676 m (5' 6\") 1.676 m (5' 5.98\")                                              BP Readings from Last 3 Encounters:   05/01/24 (!) 160/97   04/16/24 134/79   04/16/24 (!) 118/58       NPO Status:                                                                                 BMI:   Wt Readings from Last 3 Encounters:   05/01/24 111.1 kg (245 lb)   04/29/24 112.5 kg (248 lb)   04/16/24 112.7 kg (248 lb 6.4 oz)     Body mass index is 39.56 kg/m².    CBC:

## 2024-05-01 NOTE — PROCEDURES
as scheduled   Resume Coumadin tomorrow     Signed By:  Liborio Huston MD     May 1, 2024

## 2024-05-03 NOTE — ANESTHESIA POSTPROCEDURE EVALUATION
Department of Anesthesiology  Postprocedure Note    Patient: Mariluz Rodriguez  MRN: [id number]  YOB: 1965  Date of evaluation: 5/3/2024    Procedure Summary       Date: 05/01/24 [place] [facility] [place] [facility] / [facility] [place] [facility]    Anesthesia Start: 1229 Anesthesia Stop: 1257    Procedure: COLONOSCOPY BIOPSY Diagnosis:       Ulcerative colitis with complication, unspecified location (HCC)      (Ulcerative colitis with complication, unspecified location (HCC) [K51.919])    Surgeons: Liborio Huston MD Responsible Provider: Sammy Pino MD    Anesthesia Type: TIVA ASA Status: 3            Anesthesia Type: No value filed.    Janet Phase I:      Janet Phase II: Janet Score: 10    Anesthesia Post Evaluation    Patient location during evaluation: PACU  Patient participation: complete - patient participated  Level of consciousness: awake and alert  Airway patency: patent  Nausea & Vomiting: no nausea and no vomiting  Cardiovascular status: hemodynamically stable  Respiratory status: acceptable, nonlabored ventilation and spontaneous ventilation  Hydration status: euvolemic  Comments: BP (!) 144/98   Pulse 85   Temp 97.8 °F (36.6 °C) (Temporal)   Resp 19   Ht 1.676 m (5' 5.98\")   Wt 111.1 kg (245 lb)   SpO2 99%   BMI 39.56 kg/m²     Multimodal analgesia pain management approach  Pain management: adequate and satisfactory to patient    No notable events documented.

## 2024-05-11 DIAGNOSIS — K51.919 ULCERATIVE COLITIS WITH COMPLICATION, UNSPECIFIED LOCATION (HCC): ICD-10-CM

## 2024-05-13 RX ORDER — MESALAMINE 1.2 G/1
4800 TABLET, DELAYED RELEASE ORAL
Qty: 120 TABLET | Refills: 0 | OUTPATIENT
Start: 2024-05-13

## 2024-05-15 ENCOUNTER — NURSE ONLY (OUTPATIENT)
Dept: INTERNAL MEDICINE CLINIC | Facility: CLINIC | Age: 59
End: 2024-05-15

## 2024-05-15 DIAGNOSIS — Z86.711 HISTORY OF PULMONARY EMBOLUS (PE): Primary | ICD-10-CM

## 2024-05-15 DIAGNOSIS — Z79.01 LONG TERM CURRENT USE OF ANTICOAGULANT: ICD-10-CM

## 2024-05-15 LAB
INR, POC: 1.8 %
VALID INTERNAL CONTROL, POC: YES

## 2024-05-15 NOTE — PROGRESS NOTES
Patient is here for INR check, currently she is taking Warfarin 5 mg daily.       AMB POC PT/INR   Result Value Ref Range     Prothrombin time, POC         POC INR 1.3           After consulted with , Patient is to take 7.5 mg Monday, Wednesday and Friday. 5 mg on Tuesday , Thursday, Saturday and Sunday and return to the office in 2 weeks. Patient notified.

## 2024-05-16 DIAGNOSIS — K51.919 ULCERATIVE COLITIS WITH COMPLICATION, UNSPECIFIED LOCATION (HCC): ICD-10-CM

## 2024-05-17 RX ORDER — MESALAMINE 1.2 G/1
TABLET, DELAYED RELEASE ORAL
Qty: 120 TABLET | Refills: 0 | Status: SHIPPED | OUTPATIENT
Start: 2024-05-17

## 2024-05-22 ENCOUNTER — HOSPITAL ENCOUNTER (OUTPATIENT)
Dept: MAMMOGRAPHY | Age: 59
Discharge: HOME | End: 2024-05-25
Attending: INTERNAL MEDICINE
Payer: COMMERCIAL

## 2024-05-22 DIAGNOSIS — Z12.31 ENCOUNTER FOR SCREENING MAMMOGRAM FOR MALIGNANT NEOPLASM OF BREAST: ICD-10-CM

## 2024-05-22 PROCEDURE — 77063 BREAST TOMOSYNTHESIS BI: CPT

## 2024-05-29 ENCOUNTER — NURSE ONLY (OUTPATIENT)
Dept: INTERNAL MEDICINE CLINIC | Facility: CLINIC | Age: 59
End: 2024-05-29

## 2024-05-29 DIAGNOSIS — I48.11 LONGSTANDING PERSISTENT ATRIAL FIBRILLATION (HCC): ICD-10-CM

## 2024-05-29 DIAGNOSIS — Z79.01 LONG TERM CURRENT USE OF ANTICOAGULANT: Primary | ICD-10-CM

## 2024-05-29 LAB
POC INR: 2.7
PROTHROMBIN TIME, POC: 32.3

## 2024-05-29 NOTE — PROGRESS NOTES
PT/INR results today are 32.3/2.7. Pt is taking Warfarin 5 mg on Tuesday, Thursday, Saturday and Sunday. 7.5 mg on Monday, Wednesday and Friday. Per Dr Jae Tripathi, \"No change in Warfarin dose and recheck in 4 weeks.\" Pt notified at the nursing visit.   Results for orders placed or performed in visit on 05/29/24   AMB POC PT/INR   Result Value Ref Range    Prothrombin time, POC 32.3     POC INR 2.7

## 2024-06-10 DIAGNOSIS — K51.919 ULCERATIVE COLITIS WITH COMPLICATION, UNSPECIFIED LOCATION (HCC): ICD-10-CM

## 2024-06-10 RX ORDER — MESALAMINE 1.2 G/1
TABLET, DELAYED RELEASE ORAL
Qty: 120 TABLET | Refills: 0 | Status: SHIPPED | OUTPATIENT
Start: 2024-06-10

## 2024-06-26 ENCOUNTER — TELEPHONE (OUTPATIENT)
Dept: INTERNAL MEDICINE CLINIC | Facility: CLINIC | Age: 59
End: 2024-06-26

## 2024-06-26 ENCOUNTER — NURSE ONLY (OUTPATIENT)
Dept: INTERNAL MEDICINE CLINIC | Facility: CLINIC | Age: 59
End: 2024-06-26
Payer: COMMERCIAL

## 2024-06-26 VITALS — HEIGHT: 66 IN | WEIGHT: 251.6 LBS | BODY MASS INDEX: 40.43 KG/M2

## 2024-06-26 DIAGNOSIS — Z79.01 LONG TERM CURRENT USE OF ANTICOAGULANT: Primary | ICD-10-CM

## 2024-06-26 DIAGNOSIS — Z86.711 HISTORY OF PULMONARY EMBOLUS (PE): ICD-10-CM

## 2024-06-26 LAB
INR, POC: 3.8 %
VALID INTERNAL CONTROL, POC: YES

## 2024-06-26 PROCEDURE — 85610 PROTHROMBIN TIME: CPT | Performed by: INTERNAL MEDICINE

## 2024-06-26 PROCEDURE — 93793 ANTICOAG MGMT PT WARFARIN: CPT | Performed by: INTERNAL MEDICINE

## 2024-06-26 NOTE — PROGRESS NOTES
Patient presented in office today for INR.            Results for orders placed or performed in visit on 04/10/24   AMB POC PT/INR   Result Value Ref Range     Prothrombin time, POC         POC INR 3.8              Warfarin 5 mg on Tuesday, Thursday, Saturday and Sunday. 7.5 mg on Monday, Wednesday and Friday            Per Dr Tripathi, Change to 7.5 mg Mon and Thursday and 5 mg all other days.  Repeat INR in two weeks.

## 2024-06-26 NOTE — TELEPHONE ENCOUNTER
----- Message from Jae Tripathi MD sent at 6/26/2024  2:05 PM EDT -----  Change to 7.5 mg Mon and Thursday and 5 mg all other days.  Repeat INR in two weeks.

## 2024-07-05 DIAGNOSIS — K51.919 ULCERATIVE COLITIS WITH COMPLICATION, UNSPECIFIED LOCATION (HCC): ICD-10-CM

## 2024-07-06 RX ORDER — MESALAMINE 1.2 G/1
TABLET, DELAYED RELEASE ORAL
Qty: 120 TABLET | Refills: 0 | Status: SHIPPED | OUTPATIENT
Start: 2024-07-06

## 2024-07-10 ENCOUNTER — NURSE ONLY (OUTPATIENT)
Dept: INTERNAL MEDICINE CLINIC | Facility: CLINIC | Age: 59
End: 2024-07-10

## 2024-07-10 DIAGNOSIS — Z79.01 LONG TERM CURRENT USE OF ANTICOAGULANT: Primary | ICD-10-CM

## 2024-07-10 DIAGNOSIS — Z86.711 HISTORY OF PULMONARY EMBOLUS (PE): ICD-10-CM

## 2024-07-10 LAB
INR, POC: 2.5 %
VALID INTERNAL CONTROL, POC: YES

## 2024-07-10 NOTE — PROGRESS NOTES
Jannie is here to INR check.   INR result was 2.5.     Patient is currently taking Warfarin 5 mg on Tuesday, Thursday and Saturday and Sunday.   Warfarin 7.5 mg on Monday, Wednesday and Friday.     Per Dr. Tripathi, no changes on Medication dosing.   Patient is to recheck INR in 4 week and was informed at the time of visit.

## 2024-07-16 ENCOUNTER — OFFICE VISIT (OUTPATIENT)
Age: 59
End: 2024-07-16
Payer: COMMERCIAL

## 2024-07-16 ENCOUNTER — OFFICE VISIT (OUTPATIENT)
Dept: INTERNAL MEDICINE CLINIC | Facility: CLINIC | Age: 59
End: 2024-07-16
Payer: COMMERCIAL

## 2024-07-16 VITALS
HEART RATE: 63 BPM | WEIGHT: 251.6 LBS | BODY MASS INDEX: 40.43 KG/M2 | SYSTOLIC BLOOD PRESSURE: 120 MMHG | HEIGHT: 66 IN | DIASTOLIC BLOOD PRESSURE: 70 MMHG

## 2024-07-16 VITALS
SYSTOLIC BLOOD PRESSURE: 153 MMHG | RESPIRATION RATE: 16 BRPM | DIASTOLIC BLOOD PRESSURE: 82 MMHG | HEART RATE: 86 BPM | BODY MASS INDEX: 42.35 KG/M2 | OXYGEN SATURATION: 97 % | WEIGHT: 254.2 LBS | HEIGHT: 65 IN

## 2024-07-16 DIAGNOSIS — Z80.0 FAMILY HISTORY OF COLON CANCER IN FATHER: Primary | Chronic | ICD-10-CM

## 2024-07-16 DIAGNOSIS — K51.919 ULCERATIVE COLITIS WITH COMPLICATION, UNSPECIFIED LOCATION (HCC): ICD-10-CM

## 2024-07-16 DIAGNOSIS — K51.919 ULCERATIVE COLITIS WITH COMPLICATION, UNSPECIFIED LOCATION (HCC): Primary | ICD-10-CM

## 2024-07-16 DIAGNOSIS — M25.551 RIGHT HIP PAIN: ICD-10-CM

## 2024-07-16 DIAGNOSIS — K51.219 ULCERATIVE PROCTITIS WITH COMPLICATION (HCC): ICD-10-CM

## 2024-07-16 DIAGNOSIS — E78.00 HYPERCHOLESTEROLEMIA: ICD-10-CM

## 2024-07-16 DIAGNOSIS — I48.11 LONGSTANDING PERSISTENT ATRIAL FIBRILLATION (HCC): ICD-10-CM

## 2024-07-16 DIAGNOSIS — G47.33 OSA (OBSTRUCTIVE SLEEP APNEA): ICD-10-CM

## 2024-07-16 DIAGNOSIS — Z86.711 HISTORY OF PULMONARY EMBOLUS (PE): ICD-10-CM

## 2024-07-16 DIAGNOSIS — Z12.31 ENCOUNTER FOR SCREENING MAMMOGRAM FOR MALIGNANT NEOPLASM OF BREAST: Chronic | ICD-10-CM

## 2024-07-16 DIAGNOSIS — E66.01 MORBID OBESITY (HCC): ICD-10-CM

## 2024-07-16 PROCEDURE — 99214 OFFICE O/P EST MOD 30 MIN: CPT | Performed by: INTERNAL MEDICINE

## 2024-07-16 RX ORDER — MESALAMINE 1.2 G/1
TABLET, DELAYED RELEASE ORAL
Qty: 120 TABLET | Refills: 2 | Status: SHIPPED | OUTPATIENT
Start: 2024-07-16

## 2024-07-16 RX ORDER — DICYCLOMINE HCL 20 MG
20 TABLET ORAL
Qty: 120 TABLET | Refills: 2 | Status: SHIPPED | OUTPATIENT
Start: 2024-07-16

## 2024-07-16 RX ORDER — MESALAMINE 1000 MG/1
1000 SUPPOSITORY RECTAL NIGHTLY
Qty: 30 SUPPOSITORY | Refills: 2 | Status: SHIPPED | OUTPATIENT
Start: 2024-07-16

## 2024-07-16 SDOH — ECONOMIC STABILITY: FOOD INSECURITY: WITHIN THE PAST 12 MONTHS, THE FOOD YOU BOUGHT JUST DIDN'T LAST AND YOU DIDN'T HAVE MONEY TO GET MORE.: NEVER TRUE

## 2024-07-16 SDOH — ECONOMIC STABILITY: INCOME INSECURITY: HOW HARD IS IT FOR YOU TO PAY FOR THE VERY BASICS LIKE FOOD, HOUSING, MEDICAL CARE, AND HEATING?: SOMEWHAT HARD

## 2024-07-16 SDOH — ECONOMIC STABILITY: FOOD INSECURITY: WITHIN THE PAST 12 MONTHS, YOU WORRIED THAT YOUR FOOD WOULD RUN OUT BEFORE YOU GOT MONEY TO BUY MORE.: NEVER TRUE

## 2024-07-16 ASSESSMENT — ENCOUNTER SYMPTOMS
VOICE CHANGE: 0
RECTAL PAIN: 0
EYE PAIN: 0
STRIDOR: 0

## 2024-07-16 NOTE — PROGRESS NOTES
Substance and Sexual Activity    Alcohol use: Yes     Comment: one glass of wine daily    Drug use: Never    Sexual activity: Not on file   Other Topics Concern    Not on file   Social History Narrative    Not on file     Social Determinants of Health     Financial Resource Strain: Medium Risk (7/16/2024)    Overall Financial Resource Strain (CARDIA)     Difficulty of Paying Living Expenses: Somewhat hard   Food Insecurity: No Food Insecurity (7/16/2024)    Hunger Vital Sign     Worried About Running Out of Food in the Last Year: Never true     Ran Out of Food in the Last Year: Never true   Transportation Needs: Unknown (7/16/2024)    PRAPARE - Transportation     Lack of Transportation (Medical): Not on file     Lack of Transportation (Non-Medical): No   Physical Activity: Not on file   Stress: Not on file   Social Connections: Not on file   Intimate Partner Violence: Not on file   Housing Stability: Unknown (7/16/2024)    Housing Stability Vital Sign     Unable to Pay for Housing in the Last Year: Not on file     Number of Places Lived in the Last Year: Not on file     Unstable Housing in the Last Year: No       Current Outpatient Medications   Medication Sig Dispense Refill    mesalamine (LIALDA) 1.2 g EC tablet TAKE 4 TABLETS BY MOUTH ONCE DAILY WITH BREAKFAST 120 tablet 0    mesalamine (CANASA) 1000 MG suppository Place 1 suppository rectally nightly 60 suppository 3    warfarin (COUMADIN) 5 MG tablet Take 1 tablet by mouth daily 90 tablet 0    dicyclomine (BENTYL) 20 MG tablet Take 1 tablet by mouth 4 times daily (before meals and nightly) 90 tablet 3    metoprolol succinate (TOPROL XL) 50 MG extended release tablet Take 1 tablet by mouth daily      warfarin (COUMADIN) 7.5 MG tablet Take 1 tablet by mouth daily       No current facility-administered medications for this visit.       Allergies as of 07/16/2024    (No Known Allergies)       Review of Systems   Constitutional:  Negative for activity change and

## 2024-07-16 NOTE — PATIENT INSTRUCTIONS
Repeat colonoscopy in 1 year, earlier if needed   Check lab work and stool study   Continue with Lialda 4.8gm Po daily and Canasa 1gm suppository daily   Continue with Bentyl for cramps

## 2024-07-16 NOTE — PROGRESS NOTES
aside from diverticulosis- biopsies taken   Mild proctitis present- biopsies of the rectum taken  Large internal hemorrhoids       Past Medical History:   Diagnosis Date    Atrial fibrillation (HCC)     History of pulmonary embolus (PE)     Morbid obesity (HCC)     MARY (obstructive sleep apnea)     Ulcerative colitis (HCC)        Past Surgical History:   Procedure Laterality Date    COLONOSCOPY      COLONOSCOPY N/A 5/1/2024    COLONOSCOPY BIOPSY performed by Liborio Huston MD at [facility]    EDWIN AND BSO (CERVIX REMOVED)      pathology benign        No Known Allergies     Family History   Problem Relation Age of Onset    Seizures Mother     Colon Cancer Father     Coronary Art Dis Neg Hx        Current Outpatient Medications   Medication Sig Dispense Refill    mesalamine (LIALDA) 1.2 g EC tablet TAKE 4 TABLETS BY MOUTH ONCE DAILY WITH BREAKFAST 120 tablet 0    mesalamine (CANASA) 1000 MG suppository Place 1 suppository rectally nightly 60 suppository 3    warfarin (COUMADIN) 5 MG tablet Take 1 tablet by mouth daily 90 tablet 0    dicyclomine (BENTYL) 20 MG tablet Take 1 tablet by mouth 4 times daily (before meals and nightly) 90 tablet 3    metoprolol succinate (TOPROL XL) 50 MG extended release tablet Take 1 tablet by mouth daily      warfarin (COUMADIN) 7.5 MG tablet Take 1 tablet by mouth daily       No current facility-administered medications for this visit.       Review of Systems  All other systems reviewed and are negative except as noted above.          Objective     Vitals:    07/16/24 1549   BP: (!) 153/82   Pulse: 86   Resp: 16   SpO2: 97%       Physical Exam  Constitutional:       Appearance: She is not ill-appearing.   HENT:      Head: Normocephalic.      Nose: Nose normal.   Eyes:      Pupils: Pupils are equal, round, and reactive to light.   Cardiovascular:      Rate and Rhythm: Normal rate.   Pulmonary:      Effort: Pulmonary effort is normal.   Abdominal:      Palpations: Abdomen is soft.

## 2024-08-07 ENCOUNTER — OFFICE VISIT (OUTPATIENT)
Dept: ORTHOPEDIC SURGERY | Age: 59
End: 2024-08-07

## 2024-08-07 ENCOUNTER — NURSE ONLY (OUTPATIENT)
Dept: INTERNAL MEDICINE CLINIC | Facility: CLINIC | Age: 59
End: 2024-08-07

## 2024-08-07 DIAGNOSIS — Z79.01 LONG TERM CURRENT USE OF ANTICOAGULANT: ICD-10-CM

## 2024-08-07 DIAGNOSIS — Z79.01 LONG TERM CURRENT USE OF ANTICOAGULANT: Primary | ICD-10-CM

## 2024-08-07 DIAGNOSIS — M47.816 LUMBAR SPONDYLOSIS: ICD-10-CM

## 2024-08-07 DIAGNOSIS — Z86.711 HISTORY OF PULMONARY EMBOLUS (PE): Primary | ICD-10-CM

## 2024-08-07 DIAGNOSIS — Z86.711 HISTORY OF PULMONARY EMBOLUS (PE): ICD-10-CM

## 2024-08-07 DIAGNOSIS — M25.551 RIGHT HIP PAIN: Primary | ICD-10-CM

## 2024-08-07 DIAGNOSIS — M70.61 GREATER TROCHANTERIC BURSITIS OF RIGHT HIP: ICD-10-CM

## 2024-08-07 LAB
INR PPP: 1.9
PROTHROMBIN TIME: 22.7 SEC (ref 11.3–14.9)

## 2024-08-07 RX ORDER — TRIAMCINOLONE ACETONIDE 40 MG/ML
40 INJECTION, SUSPENSION INTRA-ARTICULAR; INTRAMUSCULAR ONCE
Status: SHIPPED | OUTPATIENT
Start: 2024-08-07

## 2024-08-07 NOTE — PROGRESS NOTES
trochanteric bursitis of right hip            RECOMMENDATION:    X-rays were reviewed with the patient today.  History and exam findings suggest patient has a component of right greater trochanteric bursitis causing her pain.  Its also felt that she likely has a spine component with possible associated radiculopathy contributing to her pain as well.  It is felt that she would benefit from right hip bursa injection both from a diagnostic and possibly therapeutic standpoint which she desires to receive today.  She was also instructed to avoid wearing tight pants as her reported right lateral thigh burning sensation tends to mimic meralgia paresthetica as well.    Procedure Note: Time out was performed which included identifying the patient by name and date of birth.  The procedure site was identified with all present in agreement.   After alcohol prep, I placed 40 mg of Kenalog and 2mL of 0.5 % Marcaine into the right hip bursa.  Patient tolerated the injection well without complication.  She was provided a handout for home stretching and strengthening exercises.  We discussed possible PT referral if she obtains substantial but short-lived relief from bursa injection.      As noted above, she is also experiencing some spine related pain and will be referred to the spine team for further evaluation and management.      Approximately 45 minutes was spent reviewing the medical record, imaging, performing history and physical examination, discussing the diagnosis and treatment plan with the patient, and completing documentation for this visit.

## 2024-08-10 DIAGNOSIS — K51.919 ULCERATIVE COLITIS WITH COMPLICATION, UNSPECIFIED LOCATION (HCC): ICD-10-CM

## 2024-08-10 RX ORDER — MESALAMINE 1.2 G/1
TABLET, DELAYED RELEASE ORAL
Qty: 120 TABLET | Refills: 2 | Status: CANCELLED | OUTPATIENT
Start: 2024-08-10

## 2024-08-12 DIAGNOSIS — K51.919 ULCERATIVE COLITIS WITH COMPLICATION, UNSPECIFIED LOCATION (HCC): ICD-10-CM

## 2024-08-12 RX ORDER — MESALAMINE 1.2 G/1
TABLET, DELAYED RELEASE ORAL
Qty: 120 TABLET | Refills: 2 | Status: SHIPPED | OUTPATIENT
Start: 2024-08-12

## 2024-08-21 ENCOUNTER — OFFICE VISIT (OUTPATIENT)
Dept: ORTHOPEDIC SURGERY | Age: 59
End: 2024-08-21
Payer: COMMERCIAL

## 2024-08-21 VITALS — BODY MASS INDEX: 42.32 KG/M2 | HEIGHT: 65 IN | WEIGHT: 254 LBS

## 2024-08-21 DIAGNOSIS — M47.816 LUMBAR SPONDYLOSIS: Primary | ICD-10-CM

## 2024-08-21 PROCEDURE — 99204 OFFICE O/P NEW MOD 45 MIN: CPT | Performed by: PHYSICIAN ASSISTANT

## 2024-08-21 RX ORDER — PREDNISONE 10 MG/1
10 TABLET ORAL SEE ADMIN INSTRUCTIONS
Qty: 48 EACH | Refills: 0 | Status: SHIPPED | OUTPATIENT
Start: 2024-08-21 | End: 2024-09-02

## 2024-08-21 NOTE — PROGRESS NOTES
Name: Mariluz Rodriguez  YOB: 1965  Gender: female  MRN: [id number]    CC:   Chief Complaint   Patient presents with    New Patient     Low back pain          HPI:   Mariluz Rodriguez is a 58 y.o. female with a PMHx of BMI of 42, A-fib on warfarin, PE history other comorbidities below.      They present here for evaluation of back pain rating to the right hip and thigh to the knee.  Referred to me by Dr. Andrews of the orthopedic total joint service of this practice, this been going on approximately a year.  She has numbness and tingling in the right thigh.  She is been taking Tylenol.  She is not a candidate for NSAIDs given her anticoagulation status.  Her symptoms seem to be progressively getting a little bit worse.  She recently moved to the area.  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        Past Medical History Includes:   Past Medical History:   Diagnosis Date    Atrial fibrillation (HCC)     History of pulmonary embolus (PE)     Morbid obesity (HCC)     MARY (obstructive sleep apnea)     Ulcerative colitis (HCC)    ,   Past Surgical History:   Procedure Laterality Date    COLONOSCOPY      COLONOSCOPY N/A 5/1/2024    COLONOSCOPY BIOPSY performed by Liborio Huston MD at [facility] [place] [facility]    EDWIN AND BSO (CERVIX REMOVED)      pathology benign     Family History:   Family History   Problem Relation Age of Onset    Seizures Mother     Colon Cancer Father     Coronary Art Dis Neg Hx       Social History:   Social History     Tobacco Use    Smoking status: Former     Types: Cigarettes    Smokeless tobacco: Never    Tobacco comments:     1 ppd x 30 yrs.  Quit age 48.     Substance Use Topics    Alcohol use: Yes     Comment: one glass of wine daily       ALLERGIES: No Known Allergies     Patient Medications    Current Outpatient Medications   Medication Sig Dispense Refill    mesalamine (LIALDA) 1.2 g EC tablet TAKE 4 TABLETS BY MOUTH ONCE DAILY WITH BREAKFAST 120 tablet 2    dicyclomine (BENTYL) 20 MG tablet

## 2024-08-26 ENCOUNTER — HOSPITAL ENCOUNTER (OUTPATIENT)
Dept: PHYSICAL THERAPY | Age: 59
Setting detail: RECURRING SERIES
Discharge: HOME | End: 2024-08-29
Payer: COMMERCIAL

## 2024-08-26 DIAGNOSIS — M25.551 PAIN IN RIGHT HIP: ICD-10-CM

## 2024-08-26 DIAGNOSIS — R26.2 DIFFICULTY IN WALKING, NOT ELSEWHERE CLASSIFIED: ICD-10-CM

## 2024-08-26 DIAGNOSIS — M54.51 VERTEBROGENIC LOW BACK PAIN: Primary | ICD-10-CM

## 2024-08-26 PROCEDURE — 97161 PT EVAL LOW COMPLEX 20 MIN: CPT

## 2024-08-26 PROCEDURE — 97110 THERAPEUTIC EXERCISES: CPT

## 2024-08-26 ASSESSMENT — PAIN SCALES - GENERAL: PAINLEVEL_OUTOF10: 6

## 2024-08-26 NOTE — PROGRESS NOTES
Mariluz Jennifer  : 1965  Primary: [employer] [place] [employer] [place] [employer]  Secondary:  [facility]  [address]  [address]  [facility] [address]  Phone: [phone]  Fax: [phone] Plan Frequency: 2x/week    Plan of Care/Certification Expiration Date: 24        Plan of Care/Certification Expiration Date:  Plan of Care/Certification Expiration Date: 24    Frequency/Duration: Plan Frequency: 2x/week      Time In/Out:   Time In: 1115  Time Out: 1202      PT Visit Info:    Progress Note Due Date: 24  Progress Note Counter: 1      Visit Count:  1    OUTPATIENT PHYSICAL THERAPY:   Treatment Note 2024       Episode  (LBP/R Hip)               Treatment Diagnosis:    Vertebrogenic low back pain  Pain in right hip  Difficulty in walking, not elsewhere classified  Medical/Referring Diagnosis:    Lumbar spondylosis [M47.816]    Referring Physician:  Jerry Degroot PA MD Orders:  PT Eval and Treat   Return MD Appt:  After doing PT.   Date of Onset:  Onset Date: 23    Allergies:   Patient has no known allergies.  Restrictions/Precautions:   None      Interventions Planned (Treatment may consist of any combination of the following):     See Assessment Note    Subjective Comments:   Pt reports she had pain of insidious onset on R hip pain and radicular symptoms that have caused severe deficits with standing, walking, lifting demands, and driving activities.  Pt reports she's had pain in anterior and lateral R hip, and received an injection for bursitis, which helped with pain severity but hasn't fixed everything with her R hip and low back.   Initial Pain Level::     6/10  Post Session Pain Level:       5/10  Medications Last Reviewed:  2024  Updated Objective Findings:  See Evaluation Note from today  Treatment   Therapeutic Exercises:  (24 minutes)  -The following interventions were performed to improve ROM, strength, balance, endurance or aerobic capacity to improve exercise  tolerance and facilitate return to functional ADLs.     Date:  8/26/24 Date:   Date:     Activity/Exercise Parameters Parameters Parameters   Posterior pelvic tilt X10 iso 3s     Bridging with PPT 8 x 2 iso 3s     SL clamshells  With purple band  X15 slow, x30 fast B     Mini crunch X10 iso 3s     Hip flexor stretch  X3 iso 20 secs     Psoas leg lifts X8 B slow eccentric               Treatment/Session Summary:    Treatment Assessment:   Pt educated regarding pathology, pain management and HEP, with written instruction provided.  Pt tolerated interventions well with reports of mild fatigue, verbalized understanding of HEP and plan of care.     Communication/Consultation:  Therapy Evaluation sent to referring provider  Equipment provided today:  HEP, Theraband, and Medbridge exercise access code: [email or web address]  Recommendations/Intent for next treatment session: Next visit will focus on lumbar stabilization, anterior core motor control, R psoas strengthening and stretching.    >Total Treatment Billable Duration:  47 minutes, 22 mins Low complexity eval, 24 mins TE  Time In: 1115  Time Out: 1202    Bolivar Boyce PT         Charge Capture  Events  [employer] Portal  Appt Desk  Attendance Report     Future Appointments   Date Time Provider Department Center   9/4/2024  9:10 AM Jass Villarreal MD [facility]   1/21/2025  3:00 PM Liborio Huston MD [facility]   3/4/2025 11:20 AM Jae Tripathi MD [facility]       Access Code: [email or web address]  URL: [email or web address]  Date: 08/26/2024  Prepared by: Foster Boyce    Exercises  - Supine Lower Trunk Rotation  - 1 x daily - 7 x weekly - 3 sets - 10 reps  - Supine Single Knee to Chest Stretch  - 1 x daily - 7 x weekly - 3 sets - 10 reps  - Supine Posterior Pelvic Tilt  - 1 x daily - 7 x weekly - 3 sets - 10 reps  - Supine Bridge  - 1 x daily - 7 x weekly - 3 sets - 10 reps  - Clamshell with Resistance  - 1 x daily - 7 x weekly - 3 sets - 10 reps  - Supine

## 2024-08-26 NOTE — THERAPY EVALUATION
of motion, balance, coordination, and functional technique to return to PLOF with self care activities, ADLS, community ambulation, standing and lifting demands.  Reason For Services/Other Comments:  Pt will require skilled PT intervention to address aforementioned goals and return to PLOF without pain or difficulty.        Regarding Mariluz Jennifer's therapy, I certify that the treatment plan above will be carried out by a therapist or under their direction.  Thank you for this referral,  Bolivar Boyce, PT     Referring Physician Signature: Jerry Degroot PA                    Charge Capture  Events  Appt Desk  Attendance Report

## 2024-09-04 ENCOUNTER — OFFICE VISIT (OUTPATIENT)
Dept: ORTHOPEDIC SURGERY | Age: 59
End: 2024-09-04
Payer: COMMERCIAL

## 2024-09-04 DIAGNOSIS — Z86.711 HISTORY OF PULMONARY EMBOLUS (PE): Primary | ICD-10-CM

## 2024-09-04 DIAGNOSIS — Z79.01 LONG TERM CURRENT USE OF ANTICOAGULANT: ICD-10-CM

## 2024-09-04 DIAGNOSIS — Z86.711 HISTORY OF PULMONARY EMBOLUS (PE): ICD-10-CM

## 2024-09-04 DIAGNOSIS — M70.61 GREATER TROCHANTERIC BURSITIS OF RIGHT HIP: Primary | ICD-10-CM

## 2024-09-04 LAB
INR PPP: 5.2
PROTHROMBIN TIME: 50.7 SEC (ref 11.3–14.9)

## 2024-09-04 PROCEDURE — 99213 OFFICE O/P EST LOW 20 MIN: CPT | Performed by: PHYSICIAN ASSISTANT

## 2024-09-04 NOTE — PROGRESS NOTES
Name: Mariluz Rodriguez  YOB: 1965  Gender: female  MRN: [id number]    CC:   Chief Complaint   Patient presents with    Follow-up     S/p right hip bursa injection 8/7       HPI:  The right hip pain has been substantially relieved with the greater trochanteric bursa injection which was performed 4 weeks ago.  They are more mobile and happy.  The constant ache is gone.  Function is improved.  She is still experiencing some low back pain and intermittent right lower extremity radiculopathy which is being managed with PT at this time.  No other new complaints.    ROS:  As per HPI. Pertinent postives and negatives are addressed with the patient, particularly those related to musculoskeletal concerns.  Non-orthopaedic concerns were referred back to the primary care physician.      PMFSH:    Current Outpatient Medications:     mesalamine (LIALDA) 1.2 g EC tablet, TAKE 4 TABLETS BY MOUTH ONCE DAILY WITH BREAKFAST, Disp: 120 tablet, Rfl: 2    dicyclomine (BENTYL) 20 MG tablet, Take 1 tablet by mouth 4 times daily (before meals and nightly), Disp: 120 tablet, Rfl: 2    mesalamine (CANASA) 1000 MG suppository, Place 1 suppository rectally nightly, Disp: 30 suppository, Rfl: 2    warfarin (COUMADIN) 5 MG tablet, Take 1 tablet by mouth daily, Disp: 90 tablet, Rfl: 0    metoprolol succinate (TOPROL XL) 50 MG extended release tablet, Take 1 tablet by mouth daily, Disp: , Rfl:     warfarin (COUMADIN) 7.5 MG tablet, Take 1 tablet by mouth daily, Disp: , Rfl:   No Known Allergies  Past Medical History:   Diagnosis Date    Atrial fibrillation (HCC)     History of pulmonary embolus (PE)     Morbid obesity (HCC)     MARY (obstructive sleep apnea)     Ulcerative colitis (HCC)      .pmh  Past Surgical History:   Procedure Laterality Date    COLONOSCOPY      COLONOSCOPY N/A 5/1/2024    COLONOSCOPY BIOPSY performed by Liborio Huston MD at [facility]    EDWIN AND BSO (CERVIX REMOVED)      pathology benign     Family History

## 2024-09-05 ENCOUNTER — HOSPITAL ENCOUNTER (OUTPATIENT)
Dept: PHYSICAL THERAPY | Age: 59
Setting detail: RECURRING SERIES
Discharge: HOME | End: 2024-09-08
Payer: COMMERCIAL

## 2024-09-05 PROCEDURE — 97110 THERAPEUTIC EXERCISES: CPT

## 2024-09-05 PROCEDURE — 97140 MANUAL THERAPY 1/> REGIONS: CPT

## 2024-09-05 ASSESSMENT — PAIN DESCRIPTION - LOCATION: LOCATION: BACK

## 2024-09-05 ASSESSMENT — PAIN SCALES - GENERAL: PAINLEVEL_OUTOF10: 5

## 2024-09-09 ENCOUNTER — HOSPITAL ENCOUNTER (OUTPATIENT)
Dept: PHYSICAL THERAPY | Age: 59
Setting detail: RECURRING SERIES
Discharge: HOME | End: 2024-09-12
Payer: COMMERCIAL

## 2024-09-09 PROCEDURE — 97110 THERAPEUTIC EXERCISES: CPT

## 2024-09-09 ASSESSMENT — PAIN SCALES - GENERAL: PAINLEVEL_OUTOF10: 2

## 2024-09-12 ENCOUNTER — HOSPITAL ENCOUNTER (OUTPATIENT)
Dept: PHYSICAL THERAPY | Age: 59
Setting detail: RECURRING SERIES
Discharge: HOME | End: 2024-09-15
Payer: COMMERCIAL

## 2024-09-12 PROCEDURE — 97110 THERAPEUTIC EXERCISES: CPT

## 2024-09-12 ASSESSMENT — PAIN SCALES - GENERAL: PAINLEVEL_OUTOF10: 4

## 2024-09-16 ENCOUNTER — HOSPITAL ENCOUNTER (OUTPATIENT)
Dept: PHYSICAL THERAPY | Age: 59
Setting detail: RECURRING SERIES
Discharge: HOME | End: 2024-09-19
Payer: COMMERCIAL

## 2024-09-16 PROCEDURE — 97110 THERAPEUTIC EXERCISES: CPT

## 2024-09-16 ASSESSMENT — PAIN SCALES - GENERAL: PAINLEVEL_OUTOF10: 7

## 2024-09-18 DIAGNOSIS — Z79.01 ANTICOAGULATED ON COUMADIN: ICD-10-CM

## 2024-09-18 DIAGNOSIS — K51.919 ULCERATIVE COLITIS WITH COMPLICATION, UNSPECIFIED LOCATION (HCC): ICD-10-CM

## 2024-09-18 LAB
ALBUMIN SERPL-MCNC: 3.6 G/DL (ref 3.5–5)
ALBUMIN/GLOB SERPL: 1.2 (ref 1–1.9)
ALP SERPL-CCNC: 112 U/L (ref 35–104)
ALT SERPL-CCNC: 18 U/L (ref 12–65)
ANION GAP SERPL CALC-SCNC: 9 MMOL/L (ref 9–18)
AST SERPL-CCNC: 17 U/L (ref 15–37)
BASOPHILS # BLD: 0 K/UL (ref 0–0.2)
BASOPHILS NFR BLD: 0 % (ref 0–2)
BILIRUB SERPL-MCNC: 0.4 MG/DL (ref 0–1.2)
BUN SERPL-MCNC: 15 MG/DL (ref 6–23)
CALCIUM SERPL-MCNC: 9.5 MG/DL (ref 8.8–10.2)
CHLORIDE SERPL-SCNC: 105 MMOL/L (ref 98–107)
CO2 SERPL-SCNC: 26 MMOL/L (ref 20–28)
CREAT SERPL-MCNC: 0.7 MG/DL (ref 0.6–1.1)
CRP SERPL HS-MCNC: 13.1 MG/L (ref 0–3)
DIFFERENTIAL METHOD BLD: NORMAL
EOSINOPHIL # BLD: 0.1 K/UL (ref 0–0.8)
EOSINOPHIL NFR BLD: 2 % (ref 0.5–7.8)
ERYTHROCYTE [DISTWIDTH] IN BLOOD BY AUTOMATED COUNT: 14.1 % (ref 11.9–14.6)
ERYTHROCYTE [SEDIMENTATION RATE] IN BLOOD: 16 MM/HR (ref 0–30)
GLOBULIN SER CALC-MCNC: 2.9 G/DL (ref 2.3–3.5)
GLUCOSE SERPL-MCNC: 92 MG/DL (ref 70–99)
HCT VFR BLD AUTO: 40.8 % (ref 35.8–46.3)
HGB BLD-MCNC: 13.3 G/DL (ref 11.7–15.4)
IMM GRANULOCYTES # BLD AUTO: 0 K/UL (ref 0–0.5)
IMM GRANULOCYTES NFR BLD AUTO: 1 % (ref 0–5)
INR PPP: 1.9
LYMPHOCYTES # BLD: 1.1 K/UL (ref 0.5–4.6)
LYMPHOCYTES NFR BLD: 19 % (ref 13–44)
MCH RBC QN AUTO: 30.6 PG (ref 26.1–32.9)
MCHC RBC AUTO-ENTMCNC: 32.6 G/DL (ref 31.4–35)
MCV RBC AUTO: 93.8 FL (ref 82–102)
MONOCYTES # BLD: 0.4 K/UL (ref 0.1–1.3)
MONOCYTES NFR BLD: 7 % (ref 4–12)
NEUTS SEG # BLD: 4.2 K/UL (ref 1.7–8.2)
NEUTS SEG NFR BLD: 71 % (ref 43–78)
NRBC # BLD: 0 K/UL (ref 0–0.2)
PLATELET # BLD AUTO: 195 K/UL (ref 150–450)
PMV BLD AUTO: 11.4 FL (ref 9.4–12.3)
POTASSIUM SERPL-SCNC: 4.2 MMOL/L (ref 3.5–5.1)
PROT SERPL-MCNC: 6.5 G/DL (ref 6.3–8.2)
PROTHROMBIN TIME: 22.7 SEC (ref 11.3–14.9)
RBC # BLD AUTO: 4.35 M/UL (ref 4.05–5.2)
SODIUM SERPL-SCNC: 140 MMOL/L (ref 136–145)
WBC # BLD AUTO: 5.9 K/UL (ref 4.3–11.1)

## 2024-09-19 ENCOUNTER — PATIENT MESSAGE (OUTPATIENT)
Age: 59
End: 2024-09-19

## 2024-09-19 ENCOUNTER — HOSPITAL ENCOUNTER (OUTPATIENT)
Dept: PHYSICAL THERAPY | Age: 59
Setting detail: RECURRING SERIES
Discharge: HOME | End: 2024-09-22
Payer: COMMERCIAL

## 2024-09-19 PROCEDURE — 97110 THERAPEUTIC EXERCISES: CPT

## 2024-09-19 ASSESSMENT — PAIN SCALES - GENERAL: PAINLEVEL_OUTOF10: 6

## 2024-09-23 ENCOUNTER — HOSPITAL ENCOUNTER (OUTPATIENT)
Dept: PHYSICAL THERAPY | Age: 59
Setting detail: RECURRING SERIES
Discharge: HOME | End: 2024-09-26
Payer: COMMERCIAL

## 2024-09-23 PROCEDURE — 97110 THERAPEUTIC EXERCISES: CPT

## 2024-09-23 ASSESSMENT — PAIN SCALES - GENERAL: PAINLEVEL_OUTOF10: 4

## 2024-09-26 ENCOUNTER — HOSPITAL ENCOUNTER (OUTPATIENT)
Dept: PHYSICAL THERAPY | Age: 59
Setting detail: RECURRING SERIES
Discharge: HOME | End: 2024-09-29
Payer: COMMERCIAL

## 2024-09-26 PROCEDURE — 97110 THERAPEUTIC EXERCISES: CPT

## 2024-09-26 ASSESSMENT — PAIN SCALES - GENERAL: PAINLEVEL_OUTOF10: 3

## 2024-09-26 NOTE — PROGRESS NOTES
Mariluz Rodriguez  : 1965  Primary: Bcbs Out Of State ([place] [employer]BS)  Secondary:  O [facility]  [address]  [address]  [facility] [place] [address]  Phone: [phone]  Fax: [phone] Plan Frequency: 2x/week    Plan of Care/Certification Expiration Date: 24        Plan of Care/Certification Expiration Date:  Plan of Care/Certification Expiration Date: 24    Frequency/Duration: Plan Frequency: 2x/week      Time In/Out:   Time In: 0900  Time Out: 09      PT Visit Info:    Progress Note Due Date: 10/26/24  Total # of Visits to Date: 8  Progress Note Counter: 1      Visit Count:  8                OUTPATIENT PHYSICAL THERAPY:             Progress Report 2024               Episode (LBP/R Hip)         Treatment Diagnosis:     Vertebrogenic low back pain  Pain in right hip  Difficulty in walking, not elsewhere classified  Medical/Referring Diagnosis:    Lumbar spondylosis [M47.816]    Referring Physician:  Jerry Degroot PA MD Orders:  PT Eval and Treat   Return MD Appt:  Post- PT  Date of Onset:  Onset Date: 23     Allergies:  Patient has no known allergies.  Restrictions/Precautions:    None      Medications Last Reviewed:  2024     SUBJECTIVE   History of Injury/Illness (Reason for Referral):  Pt reports she had pain of insidious onset on R hip pain and radicular symptoms that have caused severe deficits with standing, walking, lifting demands, and driving activities.  Pt reports she's had pain in anterior and lateral R hip, and received an injection for bursitis, which helped with pain severity but hasn't fixed everything with her R hip and low back.   24:  Over the course of PT, patient report gradual improvement with R hip and low back pain, but has had a few intermittent flare ups in pain.  Pt has steadily improved   Patient Stated Goal(s):  \"Reduce pain and improve performance of standing and walking\"  Initial Pain Level:      3/10   Post Session Pain Level:

## 2024-09-26 NOTE — PROGRESS NOTES
Mariluz [facility]  : 1965  Primary: [employer] Out Of [place] ([place] [employer])  Secondary:  [facility]  [address]  [address]  [facility] [address]  Phone: [phone]  Fax: [phone] Plan Frequency: 2x/week    Plan of Care/Certification Expiration Date: 24        Plan of Care/Certification Expiration Date:  Plan of Care/Certification Expiration Date: 24    Frequency/Duration: Plan Frequency: 2x/week      Time In/Out:   Time In: 0900  Time Out: 0945      PT Visit Info:    Progress Note Due Date: 10/26/24  Total # of Visits to Date: 8  Progress Note Counter: 1      Visit Count:  8    OUTPATIENT PHYSICAL THERAPY:   Treatment Note 2024       Episode  (LBP/R Hip)               Treatment Diagnosis:    Vertebrogenic low back pain  Pain in right hip  Difficulty in walking, not elsewhere classified  Medical/Referring Diagnosis:    Lumbar spondylosis [M47.816]    Referring Physician:  Jerry Degroot PA MD Orders:  PT Eval and Treat   Return MD Appt:  After doing PT.   Date of Onset:  Onset Date: 23    Allergies:   Patient has no known allergies.  Restrictions/Precautions:   None      Interventions Planned (Treatment may consist of any combination of the following):     See Assessment Note    Subjective Comments:   Patient states her back has been feeling pretty good over the last week.     Initial Pain Level::     3/10  Post Session Pain Level:       2/10  Medications Last Reviewed:  2024  Updated Objective Findings:  None Today    Treatment   Therapeutic Exercises:  (41 minutes)  -The following interventions were performed to improve ROM, strength, balance, endurance or aerobic capacity to improve exercise tolerance and facilitate return to functional ADLs.     Date:  24 Date:   24 Date:  24 Date:  24 Date:  24   Activity/Exercise Parameters        Recumbent stepper to improve aerobic tolerance and oxidative capacity of muscles.   Lv 2.3, x10 mins  X8

## 2024-09-28 LAB — CALPROTECTIN STL-MCNT: 160 UG/G (ref 0–120)

## 2024-10-02 ENCOUNTER — HOSPITAL ENCOUNTER (OUTPATIENT)
Dept: PHYSICAL THERAPY | Age: 59
Setting detail: RECURRING SERIES
End: 2024-10-02
Payer: COMMERCIAL

## 2024-10-07 ENCOUNTER — HOSPITAL ENCOUNTER (OUTPATIENT)
Dept: PHYSICAL THERAPY | Age: 59
Setting detail: RECURRING SERIES
Discharge: HOME | End: 2024-10-10
Payer: COMMERCIAL

## 2024-10-07 PROCEDURE — 97110 THERAPEUTIC EXERCISES: CPT

## 2024-10-07 ASSESSMENT — PAIN SCALES - GENERAL: PAINLEVEL_OUTOF10: 3

## 2024-10-07 NOTE — PROGRESS NOTES
Mariluz [facility]  : 1965  Primary: [employer] Out Of [place] ([place] [employer])  Secondary:  [facility]  [address]  [address]  [facility] [address]  Phone: [phone]  Fax: [phone] Plan Frequency: 2x/week    Plan of Care/Certification Expiration Date: 24        Plan of Care/Certification Expiration Date:  Plan of Care/Certification Expiration Date: 24    Frequency/Duration: Plan Frequency: 2x/week      Time In/Out:   Time In: 1030  Time Out: 1114      PT Visit Info:    Progress Note Due Date: 10/26/24  Total # of Visits to Date: 9  Progress Note Counter: 1      Visit Count:  9    OUTPATIENT PHYSICAL THERAPY:   Treatment Note 10/7/2024       Episode  (LBP/R Hip)               Treatment Diagnosis:    Vertebrogenic low back pain  Pain in right hip  Difficulty in walking, not elsewhere classified  Medical/Referring Diagnosis:    Lumbar spondylosis [M47.816]    Referring Physician:  Jerry Degroot PA MD Orders:  PT Eval and Treat   Return MD Appt:  After doing PT.   Date of Onset:  Onset Date: 23    Allergies:   Patient has no known allergies.  Restrictions/Precautions:   None      Interventions Planned (Treatment may consist of any combination of the following):     See Assessment Note    Subjective Comments:   Patient reports she's been busy, but her back has been ok over the last week or so since last visit.     Initial Pain Level::     3/10  Post Session Pain Level:       3/10  Medications Last Reviewed:  10/7/2024  Updated Objective Findings:  None Today    Treatment   Therapeutic Exercises:  (42  minutes)  -The following interventions were performed to improve ROM, strength, balance, endurance or aerobic capacity to improve exercise tolerance and facilitate return to functional ADLs.     Date:  24 Date:  9/23/24 9/26/24 10/7/24   Activity/Exercise       Recumbent stepper to improve aerobic tolerance and oxidative capacity of muscles.   X10 lv 2.5   Low/high intervals X10 mins

## 2024-10-10 ENCOUNTER — HOSPITAL ENCOUNTER (OUTPATIENT)
Dept: PHYSICAL THERAPY | Age: 59
Setting detail: RECURRING SERIES
Discharge: HOME | End: 2024-10-13
Payer: COMMERCIAL

## 2024-10-10 PROCEDURE — 97110 THERAPEUTIC EXERCISES: CPT

## 2024-10-10 ASSESSMENT — PAIN SCALES - GENERAL: PAINLEVEL_OUTOF10: 2

## 2024-10-14 ENCOUNTER — HOSPITAL ENCOUNTER (OUTPATIENT)
Dept: PHYSICAL THERAPY | Age: 59
Setting detail: RECURRING SERIES
Discharge: HOME | End: 2024-10-17
Payer: COMMERCIAL

## 2024-10-14 PROCEDURE — 97140 MANUAL THERAPY 1/> REGIONS: CPT

## 2024-10-14 PROCEDURE — 97110 THERAPEUTIC EXERCISES: CPT

## 2024-10-14 ASSESSMENT — PAIN SCALES - GENERAL: PAINLEVEL_OUTOF10: 6

## 2024-10-14 NOTE — PROGRESS NOTES
Mariluz [facility]  : 1965  Primary: [employer] Out Of State ([place] [employer])  [facility]  [facility]  [address]  [address]  [facility] [address]  Phone: [phone]  Fax: [phone] Plan Frequency: 2x/week    Plan of Care/Certification Expiration Date: 24        Plan of Care/Certification Expiration Date:  Plan of Care/Certification Expiration Date: 24    Frequency/Duration: Plan Frequency: 2x/week      Time In/Out:   Time In: 1028  Time Out: 1114      PT Visit Info:    Progress Note Due Date: 10/26/24  Total # of Visits to Date: 11  Progress Note Counter: 1      Visit Count:  11    OUTPATIENT PHYSICAL THERAPY:   Treatment Note 10/14/2024       Episode  (LBP/R Hip)               Treatment Diagnosis:    Vertebrogenic low back pain  Pain in right hip  Difficulty in walking, not elsewhere classified  Medical/Referring Diagnosis:    Lumbar spondylosis [M47.816]    Referring Physician:  Jerry Degroot PA MD Orders:  PT Eval and Treat   Return MD Appt:  After doing PT.   Date of Onset:  Onset Date: 23    Allergies:   Patient has no known allergies.  Restrictions/Precautions:   None      Interventions Planned (Treatment may consist of any combination of the following):     See Assessment Note    Subjective Comments:   Pt reports she's had significant increase in back pain over the weekend, but she's had a UC flare up which is likely contributing.   Initial Pain Level::     6/10  Post Session Pain Level:       4/10  Medications Last Reviewed:  10/14/2024  Updated Objective Findings:  None Today    Treatment   Therapeutic Exercises:  (28 minutes)  -The following interventions were performed to improve ROM, strength, balance, endurance or aerobic capacity to improve exercise tolerance and facilitate return to functional ADLs.     9/26/24 10/7/24 10/10/24 10/14/24   Activity/Exercise       Recumbent stepper to improve aerobic tolerance and oxidative capacity of muscles.   X10 mins lv 3.5 X10

## 2024-10-21 ENCOUNTER — APPOINTMENT (OUTPATIENT)
Dept: PHYSICAL THERAPY | Age: 59
End: 2024-10-21
Payer: COMMERCIAL

## 2024-10-24 ENCOUNTER — APPOINTMENT (OUTPATIENT)
Dept: PHYSICAL THERAPY | Age: 59
End: 2024-10-24
Payer: COMMERCIAL

## 2024-10-29 DIAGNOSIS — Z86.711 HISTORY OF PULMONARY EMBOLUS (PE): Primary | ICD-10-CM

## 2024-10-29 DIAGNOSIS — Z79.01 LONG TERM CURRENT USE OF ANTICOAGULANT: ICD-10-CM

## 2024-10-29 DIAGNOSIS — Z86.711 HISTORY OF PULMONARY EMBOLUS (PE): ICD-10-CM

## 2024-10-29 LAB
INR PPP: 1.6
PROTHROMBIN TIME: 19.7 SEC (ref 11.3–14.9)

## 2024-10-31 ENCOUNTER — HOSPITAL ENCOUNTER (OUTPATIENT)
Dept: PHYSICAL THERAPY | Age: 59
Setting detail: RECURRING SERIES
End: 2024-10-31
Payer: COMMERCIAL

## 2024-10-31 NOTE — PROGRESS NOTES
Mariluz Rodriguez  : 1965  Primary: [employer] Out [place]  Secondary:  [facility]  [address]  [address]  [facility] [address]  Phone: [phone]  Fax: [phone]    PT Visit Info:    Total # of Visits to Date: 11  Progress Note Counter: 1  Progress Note Due Date: 10/26/24  No Show: 2     OT Visit Info:  No data recorded    OUTPATIENT THERAPY: 10/31/2024  Episode  Appt Desk        Mariluz Rodriguez did not show for her appointment for today.  Will plan to follow up next during next appointment.    []  Attempted to call patient    Thank you,  Bolivar Boyce, PT    Future Appointments   Date Time Provider Department Center   2025  3:00 PM Liborio Huston MD [facility]   3/4/2025 11:20 AM Jae Tripathi MD [facility] DEP

## 2024-11-05 NOTE — RESULT ENCOUNTER NOTE
Please call patient to confirm her current warfarin dose.  Her INR is low and she will need a higher dose.

## 2024-11-06 ENCOUNTER — TELEPHONE (OUTPATIENT)
Dept: INTERNAL MEDICINE CLINIC | Facility: CLINIC | Age: 59
End: 2024-11-06

## 2024-11-06 NOTE — TELEPHONE ENCOUNTER
----- Message from Dr. Jae Tripathi MD sent at 11/5/2024  5:46 PM EST -----  Please call patient to confirm her current warfarin dose.  Her INR is low and she will need a higher dose.

## 2024-11-06 NOTE — TELEPHONE ENCOUNTER
[employer] message sent to patient. Awaiting for response and will call later if she does not respond.

## 2024-11-08 DIAGNOSIS — Z79.01 LONG TERM CURRENT USE OF ANTICOAGULANT: ICD-10-CM

## 2024-11-08 DIAGNOSIS — I48.11 LONGSTANDING PERSISTENT ATRIAL FIBRILLATION (HCC): ICD-10-CM

## 2024-11-08 DIAGNOSIS — Z86.711 HISTORY OF PULMONARY EMBOLUS (PE): Primary | ICD-10-CM

## 2024-11-19 DIAGNOSIS — Z79.01 LONG TERM CURRENT USE OF ANTICOAGULANT: ICD-10-CM

## 2024-11-19 DIAGNOSIS — Z86.711 HISTORY OF PULMONARY EMBOLUS (PE): ICD-10-CM

## 2024-11-19 DIAGNOSIS — I48.11 LONGSTANDING PERSISTENT ATRIAL FIBRILLATION (HCC): ICD-10-CM

## 2024-11-19 LAB
INR PPP: 2.2
PROTHROMBIN TIME: 25.7 SEC (ref 11.3–14.9)

## 2024-11-20 ENCOUNTER — TELEPHONE (OUTPATIENT)
Dept: INTERNAL MEDICINE CLINIC | Facility: CLINIC | Age: 59
End: 2024-11-20

## 2024-11-20 NOTE — TELEPHONE ENCOUNTER
----- Message from Dr. Jae Tripathi MD sent at 11/19/2024  5:27 PM EST -----  Call patient.  Continue current warfarin dose and repeat INR one month

## 2024-11-25 ENCOUNTER — PATIENT MESSAGE (OUTPATIENT)
Dept: INTERNAL MEDICINE CLINIC | Facility: CLINIC | Age: 59
End: 2024-11-25

## 2024-11-25 ENCOUNTER — TELEPHONE (OUTPATIENT)
Dept: INTERNAL MEDICINE CLINIC | Facility: CLINIC | Age: 59
End: 2024-11-25

## 2024-11-25 DIAGNOSIS — I48.11 LONGSTANDING PERSISTENT ATRIAL FIBRILLATION (HCC): Primary | ICD-10-CM

## 2024-11-25 RX ORDER — METOPROLOL SUCCINATE 50 MG/1
50 TABLET, EXTENDED RELEASE ORAL DAILY
Qty: 90 TABLET | Refills: 1 | Status: SHIPPED | OUTPATIENT
Start: 2024-11-25

## 2024-11-25 NOTE — TELEPHONE ENCOUNTER
Requested Prescriptions     Pending Prescriptions Disp Refills    metoprolol succinate (TOPROL XL) 50 MG extended release tablet 90 tablet 1     Sig: Take 1 tablet by mouth daily     Dose verified and to [facility]. Patient is scheduled for follow up visit follow up visit. She is no longer seeing Cardiologist.

## 2024-11-25 NOTE — TELEPHONE ENCOUNTER
Patient is requesting referral to a new Cardiologist. Old one does not covered by insurance anymore.

## 2024-11-26 ENCOUNTER — OFFICE VISIT (OUTPATIENT)
Age: 59
End: 2024-11-26
Payer: COMMERCIAL

## 2024-11-26 VITALS
BODY MASS INDEX: 43.78 KG/M2 | WEIGHT: 262.8 LBS | HEART RATE: 91 BPM | SYSTOLIC BLOOD PRESSURE: 138 MMHG | DIASTOLIC BLOOD PRESSURE: 64 MMHG | HEIGHT: 65 IN

## 2024-11-26 DIAGNOSIS — R06.02 SHORTNESS OF BREATH: ICD-10-CM

## 2024-11-26 DIAGNOSIS — I48.91 ATRIAL FIBRILLATION, UNSPECIFIED TYPE (HCC): Primary | ICD-10-CM

## 2024-11-26 PROCEDURE — 93000 ELECTROCARDIOGRAM COMPLETE: CPT | Performed by: INTERNAL MEDICINE

## 2024-11-26 PROCEDURE — 99204 OFFICE O/P NEW MOD 45 MIN: CPT | Performed by: INTERNAL MEDICINE

## 2024-11-26 ASSESSMENT — ENCOUNTER SYMPTOMS
ABDOMINAL PAIN: 0
SHORTNESS OF BREATH: 0

## 2024-11-26 NOTE — PROGRESS NOTES
Taking? Authorizing Provider   metoprolol succinate (TOPROL XL) 50 MG extended release tablet Take 1 tablet by mouth daily 11/25/24   Jae Tripathi MD   mesalamine (LIALDA) 1.2 g EC tablet TAKE 4 TABLETS BY MOUTH ONCE DAILY WITH BREAKFAST 8/12/24   Liborio Huston MD   dicyclomine (BENTYL) 20 MG tablet Take 1 tablet by mouth 4 times daily (before meals and nightly) 7/16/24   Liborio Huston MD   mesalamine (CANASA) 1000 MG suppository Place 1 suppository rectally nightly 7/16/24   Liborio Huston MD   warfarin (COUMADIN) 5 MG tablet Take 1 tablet by mouth daily 4/16/24   Jae Tripathi MD   warfarin (COUMADIN) 7.5 MG tablet Take 1 tablet by mouth daily 9/12/23   ProviderBlake MD     No Known Allergies  Past Medical History:   Diagnosis Date    Atrial fibrillation (HCC)     History of pulmonary embolus (PE)     Morbid obesity     MARY (obstructive sleep apnea)     Ulcerative colitis (HCC)      Past Surgical History:   Procedure Laterality Date    COLONOSCOPY      COLONOSCOPY N/A 5/1/2024    COLONOSCOPY BIOPSY performed by Liborio Huston MD at [facility]    EDWIN AND BSO (CERVIX REMOVED)      pathology benign     Family History   Problem Relation Age of Onset    Seizures Mother     Colon Cancer Father     Coronary Art Dis Neg Hx      Social History     Tobacco Use    Smoking status: Former     Types: Cigarettes    Smokeless tobacco: Never    Tobacco comments:     1 ppd x 30 yrs.  Quit age 48.     Substance Use Topics    Alcohol use: Yes     Comment: one glass of wine daily       ROS:    Review of Systems   Constitutional: Negative for chills, diaphoresis and fever.   HENT:  Negative for hearing loss.    Eyes:  Negative for visual disturbance.   Cardiovascular:         As per the HPI   Respiratory:  Negative for shortness of breath.    Hematologic/Lymphatic: Does not bruise/bleed easily.   Gastrointestinal:  Negative for abdominal pain.   Genitourinary:  Negative for dysuria.   Neurological:  Negative

## 2024-12-13 DIAGNOSIS — I48.11 LONGSTANDING PERSISTENT ATRIAL FIBRILLATION (HCC): ICD-10-CM

## 2024-12-13 DIAGNOSIS — Z86.711 HISTORY OF PULMONARY EMBOLUS (PE): ICD-10-CM

## 2024-12-13 RX ORDER — WARFARIN SODIUM 5 MG/1
5 TABLET ORAL DAILY
Qty: 90 TABLET | Refills: 0 | Status: SHIPPED | OUTPATIENT
Start: 2024-12-13

## 2024-12-13 NOTE — TELEPHONE ENCOUNTER
Requested Prescriptions     Pending Prescriptions Disp Refills    warfarin (COUMADIN) 5 MG tablet [Pharmacy Med Name: Warfarin Sodium 5 MG Oral Tablet] 90 tablet 0     Sig: Take 1 tablet by mouth once daily      Dose verified and to [facility].   Patient is scheduled for follow up visit.

## 2024-12-18 DIAGNOSIS — Z86.711 HISTORY OF PULMONARY EMBOLUS (PE): ICD-10-CM

## 2024-12-18 DIAGNOSIS — I48.11 LONGSTANDING PERSISTENT ATRIAL FIBRILLATION (HCC): ICD-10-CM

## 2024-12-18 DIAGNOSIS — Z79.01 LONG TERM CURRENT USE OF ANTICOAGULANT: ICD-10-CM

## 2024-12-18 LAB
INR PPP: 2.3
PROTHROMBIN TIME: 26.6 SEC (ref 11.3–14.9)

## 2025-01-15 DIAGNOSIS — Z12.83 SKIN CANCER SCREENING: Primary | ICD-10-CM

## 2025-01-21 ENCOUNTER — OFFICE VISIT (OUTPATIENT)
Age: 60
End: 2025-01-21
Payer: COMMERCIAL

## 2025-01-21 VITALS
HEART RATE: 96 BPM | DIASTOLIC BLOOD PRESSURE: 82 MMHG | WEIGHT: 264.4 LBS | OXYGEN SATURATION: 98 % | RESPIRATION RATE: 16 BRPM | HEIGHT: 65 IN | BODY MASS INDEX: 44.05 KG/M2 | SYSTOLIC BLOOD PRESSURE: 132 MMHG | TEMPERATURE: 97.7 F

## 2025-01-21 DIAGNOSIS — Z86.711 HISTORY OF PULMONARY EMBOLUS (PE): ICD-10-CM

## 2025-01-21 DIAGNOSIS — Z79.01 LONG TERM CURRENT USE OF ANTICOAGULANT: ICD-10-CM

## 2025-01-21 DIAGNOSIS — K51.019 ULCERATIVE PANCOLITIS WITH COMPLICATION (HCC): Primary | ICD-10-CM

## 2025-01-21 DIAGNOSIS — I48.11 LONGSTANDING PERSISTENT ATRIAL FIBRILLATION (HCC): ICD-10-CM

## 2025-01-21 LAB
INR PPP: 1.8
PROTHROMBIN TIME: 21.1 SEC (ref 11.3–14.9)

## 2025-01-21 PROCEDURE — 99214 OFFICE O/P EST MOD 30 MIN: CPT | Performed by: INTERNAL MEDICINE

## 2025-01-21 NOTE — PROGRESS NOTES
Mariluz Rodriguez (:  1965) is a 59 y.o. female new patient referred to our office for evaluation of the following chief complaint(s):  No chief complaint on file.      Assessment & Plan   ASSESSMENT/PLAN:  1)  Follow up- history of ulcerative colitis, appears to have active disease confined to the rectum on recent biopsies without any inflammation noted throughout remaining portion of the colon. 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 Patient was on Lialda 4.8 gm daily and Canasa topical therapy (suppository) was started after recent colonoscopy on  which showed proctitis. Patient appears to be doing well at this time and denies any diarrhea, significant bleeding, etc. Takes Bentyl for pain.      Plan  Repeat colonoscopy in May (1 year follow up) to assess status of inflammation, earlier if needed   Continue with current medications (Lialda 4.8 gm oral daily and Canasa suppository 1gm per rectum at night)   Check CBC, CMP, fecal calprotectin level, ESR, and CRP, further recommendations to follow   Continue with Bentyl for any cramps    Subjective   SUBJECTIVE/OBJECTIVE  Mariluz Rodriguez is a 58 y.o. year old female with PMH pertinent for A fib presents to the GI clinic in follow up regarding her underlying UC. Patient has a history of what appears to be pan-UC with initial biopsies showing disease in the cecum as well a rectum.  Recent colonoscopy showed proctitis with remaining colon being unremarkable. She was started on topical therapy with Canasa suppository as well as her Lialda.   Today the patient states that she is doing quite well without any weight loss, nausea, vomiting, abdominal pain, tenesmus, nocturnal diarrhea, hematochezia, or significant diarrhea or constipation.       Colonoscopy- May 2024 Colonoscopy- 24  Postoperative Diagnosis:   Erosion in the ileum- biopsies taken to rule out Crohns

## 2025-01-21 NOTE — PATIENT INSTRUCTIONS
Repeat colonoscopy in May to assess status of inflammation, earlier if needed   Continue with current medications (Lialda 4.8 gm oral daily and Canasa suppository 1gm per rectum at night)   Check CBC, CMP, fecal calprotectin level, ESR, and CRP, further recommendations to follow   Continue with Bentyl for any cramps

## 2025-01-22 ENCOUNTER — TELEPHONE (OUTPATIENT)
Dept: INTERNAL MEDICINE CLINIC | Facility: CLINIC | Age: 60
End: 2025-01-22

## 2025-01-22 ENCOUNTER — TELEPHONE (OUTPATIENT)
Age: 60
End: 2025-01-22

## 2025-01-22 DIAGNOSIS — Z86.711 HISTORY OF PULMONARY EMBOLUS (PE): Primary | ICD-10-CM

## 2025-01-22 DIAGNOSIS — Z79.01 LONG TERM CURRENT USE OF ANTICOAGULANT: ICD-10-CM

## 2025-01-22 NOTE — RESULT ENCOUNTER NOTE
Call patient.  INR is slight low on warfarin 7.5 mg Mon and Friday and 5 mg other days.     Change dose to 7.5 mg Mon, Wed, and Friday with 5 mg other four days per week.  Repeat INR in 2 weeks.

## 2025-01-22 NOTE — TELEPHONE ENCOUNTER
Called left message for pt to call office back    ----- Message from LORI CEDILLO MA sent at 1/21/2025  2:02 PM EST -----    ----- Message -----  From: Bolivar Dmoinguez III, MD  Sent: 1/21/2025   1:46 PM EST  To: Adwoa Rooney MA    Please let patient know that there stress test was suggested a possible old heart attack.  No evidence of active blockages.  Patient can see me back in the office to discuss further

## 2025-01-22 NOTE — TELEPHONE ENCOUNTER
----- Message from Dr. Jae Tripathi MD sent at 1/22/2025  2:00 PM EST -----  Call patient.  INR is slight low on warfarin 7.5 mg Mon and Friday and 5 mg other days.     Change dose to 7.5 mg Mon, Wed, and Friday with 5 mg other four days per week.  Repeat INR in 2 weeks.

## 2025-01-23 ENCOUNTER — TELEPHONE (OUTPATIENT)
Age: 60
End: 2025-01-23

## 2025-01-27 DIAGNOSIS — K51.919 ULCERATIVE COLITIS WITH COMPLICATION, UNSPECIFIED LOCATION (HCC): ICD-10-CM

## 2025-01-27 RX ORDER — MESALAMINE 1.2 G/1
TABLET, DELAYED RELEASE ORAL
Qty: 120 TABLET | Refills: 0 | Status: SHIPPED | OUTPATIENT
Start: 2025-01-27 | End: 2025-01-30 | Stop reason: SDUPTHER

## 2025-01-29 DIAGNOSIS — K51.019 ULCERATIVE PANCOLITIS WITH COMPLICATION (HCC): ICD-10-CM

## 2025-01-29 LAB
ALBUMIN SERPL-MCNC: 3.7 G/DL (ref 3.2–4.6)
ALBUMIN/GLOB SERPL: 1.1 (ref 1–1.9)
ALP SERPL-CCNC: 118 U/L (ref 35–104)
ALT SERPL-CCNC: 20 U/L (ref 8–45)
ANION GAP SERPL CALC-SCNC: 11 MMOL/L (ref 7–16)
AST SERPL-CCNC: 24 U/L (ref 15–37)
BASOPHILS # BLD: 0.02 K/UL (ref 0–0.2)
BASOPHILS NFR BLD: 0.4 % (ref 0–2)
BILIRUB SERPL-MCNC: 0.2 MG/DL (ref 0–1.2)
BUN SERPL-MCNC: 12 MG/DL (ref 8–23)
CALCIUM SERPL-MCNC: 9.7 MG/DL (ref 8.8–10.2)
CHLORIDE SERPL-SCNC: 106 MMOL/L (ref 98–107)
CO2 SERPL-SCNC: 27 MMOL/L (ref 20–29)
CREAT SERPL-MCNC: 0.69 MG/DL (ref 0.6–1.1)
CRP SERPL HS-MCNC: 5.7 MG/L (ref 0–3)
DIFFERENTIAL METHOD BLD: NORMAL
EOSINOPHIL # BLD: 0.07 K/UL (ref 0–0.8)
EOSINOPHIL NFR BLD: 1.4 % (ref 0.5–7.8)
ERYTHROCYTE [DISTWIDTH] IN BLOOD BY AUTOMATED COUNT: 12.4 % (ref 11.9–14.6)
ERYTHROCYTE [SEDIMENTATION RATE] IN BLOOD: 10 MM/HR (ref 0–30)
GLOBULIN SER CALC-MCNC: 3.3 G/DL (ref 2.3–3.5)
GLUCOSE SERPL-MCNC: 97 MG/DL (ref 70–99)
HCT VFR BLD AUTO: 41.7 % (ref 35.8–46.3)
HGB BLD-MCNC: 13.7 G/DL (ref 11.7–15.4)
IMM GRANULOCYTES # BLD AUTO: 0.02 K/UL (ref 0–0.5)
IMM GRANULOCYTES NFR BLD AUTO: 0.4 % (ref 0–5)
LYMPHOCYTES # BLD: 1.38 K/UL (ref 0.5–4.6)
LYMPHOCYTES NFR BLD: 26.9 % (ref 13–44)
MCH RBC QN AUTO: 30.1 PG (ref 26.1–32.9)
MCHC RBC AUTO-ENTMCNC: 32.9 G/DL (ref 31.4–35)
MCV RBC AUTO: 91.6 FL (ref 82–102)
MONOCYTES # BLD: 0.37 K/UL (ref 0.1–1.3)
MONOCYTES NFR BLD: 7.2 % (ref 4–12)
NEUTS SEG # BLD: 3.27 K/UL (ref 1.7–8.2)
NEUTS SEG NFR BLD: 63.7 % (ref 43–78)
NRBC # BLD: 0 K/UL (ref 0–0.2)
PLATELET # BLD AUTO: 209 K/UL (ref 150–450)
PMV BLD AUTO: 11.5 FL (ref 9.4–12.3)
POTASSIUM SERPL-SCNC: 4.1 MMOL/L (ref 3.5–5.1)
PROT SERPL-MCNC: 7 G/DL (ref 6.3–8.2)
RBC # BLD AUTO: 4.55 M/UL (ref 4.05–5.2)
SODIUM SERPL-SCNC: 143 MMOL/L (ref 136–145)
WBC # BLD AUTO: 5.1 K/UL (ref 4.3–11.1)

## 2025-01-30 DIAGNOSIS — K51.919 ULCERATIVE COLITIS WITH COMPLICATION, UNSPECIFIED LOCATION (HCC): ICD-10-CM

## 2025-01-30 RX ORDER — MESALAMINE 1.2 G/1
TABLET, DELAYED RELEASE ORAL
Qty: 120 TABLET | Refills: 2 | Status: SHIPPED | OUTPATIENT
Start: 2025-01-30

## 2025-01-31 LAB — CALPROTECTIN STL-MCNT: 156 UG/G (ref 0–120)

## 2025-02-04 ENCOUNTER — OFFICE VISIT (OUTPATIENT)
Age: 60
End: 2025-02-04
Payer: COMMERCIAL

## 2025-02-04 VITALS
SYSTOLIC BLOOD PRESSURE: 146 MMHG | DIASTOLIC BLOOD PRESSURE: 88 MMHG | HEART RATE: 68 BPM | BODY MASS INDEX: 43.9 KG/M2 | HEIGHT: 65 IN | WEIGHT: 263.5 LBS

## 2025-02-04 DIAGNOSIS — R06.02 SHORTNESS OF BREATH: Primary | ICD-10-CM

## 2025-02-04 DIAGNOSIS — Z86.711 HISTORY OF PULMONARY EMBOLUS (PE): ICD-10-CM

## 2025-02-04 DIAGNOSIS — Z79.01 LONG TERM CURRENT USE OF ANTICOAGULANT: ICD-10-CM

## 2025-02-04 LAB
INR PPP: 2.7
PROTHROMBIN TIME: 28.7 SEC (ref 11.3–14.9)

## 2025-02-04 PROCEDURE — 99214 OFFICE O/P EST MOD 30 MIN: CPT | Performed by: INTERNAL MEDICINE

## 2025-02-04 ASSESSMENT — ENCOUNTER SYMPTOMS
ABDOMINAL PAIN: 0
SHORTNESS OF BREATH: 0

## 2025-02-04 NOTE — PROGRESS NOTES
[facility] CARDIOLOGY  [address] [place] [address], [address]  [place] [address]  PHONE: [phone]      25    NAME:  Mariluz Rodriguez  : 1965  MRN: [id number]         SUBJECTIVE:   Mariluz Rodriguez is a 59 y.o. female seen for a follow up visit regarding the following:     Chief Complaint   Patient presents with    Atrial Fibrillation    Hyperlipidemia    Results     NST results            HPI:  Follow up  Atrial Fibrillation, Hyperlipidemia, and Results (NST results)   .    Ms. Rodriguez presents today for follow-up.  We reviewed her stress test which showed a fixed anteroseptal defect, possibly consistent with infarct.  Study was then gated due to atrial fibrillation.  She continues to have breathlessness with activity.  No chest pain or pressure.  Otherwise no acute complaints today.  No ischemia was noted on her stress test    Atrial Fibrillation  Symptoms are negative for shortness of breath. Past medical history includes hyperlipidemia.   Hyperlipidemia  Pertinent negatives include no focal weakness or shortness of breath.           Cardiac Medications       Beta Blockers Cardio-Selective       metoprolol succinate (TOPROL XL) 50 MG extended release tablet Take 1 tablet by mouth daily       Coumarin Anticoagulants       warfarin (COUMADIN) 5 MG tablet Take 1 tablet by mouth once daily     warfarin (COUMADIN) 7.5 MG tablet Take 1 tablet by mouth daily                  Past Medical History, Past Surgical History, Family history, Social History, and Medications were all reviewed with the patient today and updated as necessary.     Prior to Admission medications    Medication Sig Start Date End Date Taking? Authorizing Provider   mesalamine (LIALDA) 1.2 g EC tablet TAKE 4 TABLETS BY MOUTH ONCE DAILY WITH BREAKFAST 25  Yes Liborio Huston MD   warfarin (COUMADIN) 5 MG tablet Take 1 tablet by mouth once daily 24  Yes Jae Tripathi MD   metoprolol succinate (TOPROL XL) 50 MG extended release

## 2025-02-06 ENCOUNTER — TELEPHONE (OUTPATIENT)
Dept: INTERNAL MEDICINE CLINIC | Facility: CLINIC | Age: 60
End: 2025-02-06

## 2025-02-06 DIAGNOSIS — Z79.01 LONG TERM CURRENT USE OF ANTICOAGULANT: Primary | ICD-10-CM

## 2025-02-06 DIAGNOSIS — Z86.711 HISTORY OF PULMONARY EMBOLUS (PE): ICD-10-CM

## 2025-02-06 NOTE — TELEPHONE ENCOUNTER
----- Message from Dr. Jae Tripathi MD sent at 2/5/2025  5:17 PM EST -----  Call patient.  INR therapeutic.  Continue current warfarin.  Repeat INR 4 weeks.

## 2025-02-26 ENCOUNTER — APPOINTMENT (OUTPATIENT)
Dept: URBAN - METROPOLITAN AREA CLINIC 24 | Facility: CLINIC | Age: 60
Setting detail: DERMATOLOGY
End: 2025-02-26

## 2025-02-26 DIAGNOSIS — K51.919 ULCERATIVE COLITIS WITH COMPLICATION, UNSPECIFIED LOCATION (HCC): ICD-10-CM

## 2025-02-26 DIAGNOSIS — D18.0 HEMANGIOMA: ICD-10-CM

## 2025-02-26 DIAGNOSIS — L57.8 OTHER SKIN CHANGES DUE TO CHRONIC EXPOSURE TO NONIONIZING RADIATION: ICD-10-CM

## 2025-02-26 DIAGNOSIS — L81.4 OTHER MELANIN HYPERPIGMENTATION: ICD-10-CM

## 2025-02-26 DIAGNOSIS — Z79.01 LONG TERM CURRENT USE OF ANTICOAGULANT: ICD-10-CM

## 2025-02-26 DIAGNOSIS — Z86.711 HISTORY OF PULMONARY EMBOLUS (PE): ICD-10-CM

## 2025-02-26 DIAGNOSIS — L82.1 OTHER SEBORRHEIC KERATOSIS: ICD-10-CM

## 2025-02-26 DIAGNOSIS — D485 NEOPLASM OF UNCERTAIN BEHAVIOR OF SKIN: ICD-10-CM

## 2025-02-26 DIAGNOSIS — Z71.89 OTHER SPECIFIED COUNSELING: ICD-10-CM

## 2025-02-26 DIAGNOSIS — D22 MELANOCYTIC NEVI: ICD-10-CM

## 2025-02-26 DIAGNOSIS — E78.00 HYPERCHOLESTEROLEMIA: ICD-10-CM

## 2025-02-26 PROBLEM — D18.01 HEMANGIOMA OF SKIN AND SUBCUTANEOUS TISSUE: Status: ACTIVE | Noted: 2025-02-26

## 2025-02-26 PROBLEM — D23.72 OTHER BENIGN NEOPLASM OF SKIN OF LEFT LOWER LIMB, INCLUDING HIP: Status: ACTIVE | Noted: 2025-02-26

## 2025-02-26 PROBLEM — D22.5 MELANOCYTIC NEVI OF TRUNK: Status: ACTIVE | Noted: 2025-02-26

## 2025-02-26 PROBLEM — D48.5 NEOPLASM OF UNCERTAIN BEHAVIOR OF SKIN: Status: ACTIVE | Noted: 2025-02-26

## 2025-02-26 LAB
ALBUMIN SERPL-MCNC: 3.8 G/DL (ref 3.2–4.6)
ALBUMIN/GLOB SERPL: 1.2 (ref 1–1.9)
ALP SERPL-CCNC: 117 U/L (ref 35–104)
ALT SERPL-CCNC: 14 U/L (ref 8–45)
ANION GAP SERPL CALC-SCNC: 12 MMOL/L (ref 7–16)
AST SERPL-CCNC: 22 U/L (ref 15–37)
BASOPHILS # BLD: 0.02 K/UL (ref 0–0.2)
BASOPHILS NFR BLD: 0.4 % (ref 0–2)
BILIRUB SERPL-MCNC: 0.4 MG/DL (ref 0–1.2)
BUN SERPL-MCNC: 12 MG/DL (ref 8–23)
CALCIUM SERPL-MCNC: 9.8 MG/DL (ref 8.8–10.2)
CHLORIDE SERPL-SCNC: 106 MMOL/L (ref 98–107)
CHOLEST SERPL-MCNC: 209 MG/DL (ref 0–200)
CO2 SERPL-SCNC: 25 MMOL/L (ref 20–29)
CREAT SERPL-MCNC: 0.63 MG/DL (ref 0.6–1.1)
DIFFERENTIAL METHOD BLD: NORMAL
EOSINOPHIL # BLD: 0.09 K/UL (ref 0–0.8)
EOSINOPHIL NFR BLD: 1.7 % (ref 0.5–7.8)
ERYTHROCYTE [DISTWIDTH] IN BLOOD BY AUTOMATED COUNT: 13.4 % (ref 11.9–14.6)
GLOBULIN SER CALC-MCNC: 3.2 G/DL (ref 2.3–3.5)
GLUCOSE SERPL-MCNC: 97 MG/DL (ref 70–99)
HCT VFR BLD AUTO: 41.7 % (ref 35.8–46.3)
HDLC SERPL-MCNC: 56 MG/DL (ref 40–60)
HDLC SERPL: 3.7 (ref 0–5)
HGB BLD-MCNC: 13.6 G/DL (ref 11.7–15.4)
IMM GRANULOCYTES # BLD AUTO: 0.02 K/UL (ref 0–0.5)
IMM GRANULOCYTES NFR BLD AUTO: 0.4 % (ref 0–5)
INR PPP: 2.3
LDLC SERPL CALC-MCNC: 138 MG/DL (ref 0–100)
LYMPHOCYTES # BLD: 1.27 K/UL (ref 0.5–4.6)
LYMPHOCYTES NFR BLD: 23.8 % (ref 13–44)
MCH RBC QN AUTO: 29.6 PG (ref 26.1–32.9)
MCHC RBC AUTO-ENTMCNC: 32.6 G/DL (ref 31.4–35)
MCV RBC AUTO: 90.8 FL (ref 82–102)
MONOCYTES # BLD: 0.44 K/UL (ref 0.1–1.3)
MONOCYTES NFR BLD: 8.3 % (ref 4–12)
NEUTS SEG # BLD: 3.49 K/UL (ref 1.7–8.2)
NEUTS SEG NFR BLD: 65.4 % (ref 43–78)
NRBC # BLD: 0 K/UL (ref 0–0.2)
PLATELET # BLD AUTO: 191 K/UL (ref 150–450)
PMV BLD AUTO: 11.4 FL (ref 9.4–12.3)
POTASSIUM SERPL-SCNC: 4.2 MMOL/L (ref 3.5–5.1)
PROT SERPL-MCNC: 7 G/DL (ref 6.3–8.2)
PROTHROMBIN TIME: 26.9 SEC (ref 11.3–14.9)
RBC # BLD AUTO: 4.59 M/UL (ref 4.05–5.2)
SODIUM SERPL-SCNC: 143 MMOL/L (ref 136–145)
TRIGL SERPL-MCNC: 76 MG/DL (ref 0–150)
VLDLC SERPL CALC-MCNC: 15 MG/DL (ref 6–23)
WBC # BLD AUTO: 5.3 K/UL (ref 4.3–11.1)

## 2025-02-26 PROCEDURE — 99203 OFFICE O/P NEW LOW 30 MIN: CPT | Mod: 25

## 2025-02-26 PROCEDURE — 11102 TANGNTL BX SKIN SINGLE LES: CPT

## 2025-02-26 PROCEDURE — ? BIOPSY BY SHAVE METHOD

## 2025-02-26 PROCEDURE — ? REFERRAL CORRESPONDENCE

## 2025-02-26 PROCEDURE — ? COUNSELING

## 2025-02-26 PROCEDURE — ? DEFER

## 2025-02-26 ASSESSMENT — LOCATION SIMPLE DESCRIPTION DERM
LOCATION SIMPLE: LEFT CHEEK
LOCATION SIMPLE: RIGHT UPPER ARM
LOCATION SIMPLE: LEFT ANTERIOR NECK
LOCATION SIMPLE: UPPER BACK
LOCATION SIMPLE: RIGHT CHEEK
LOCATION SIMPLE: CHEST
LOCATION SIMPLE: LEFT UPPER ARM
LOCATION SIMPLE: RIGHT SHOULDER

## 2025-02-26 ASSESSMENT — LOCATION DETAILED DESCRIPTION DERM
LOCATION DETAILED: RIGHT ANTERIOR PROXIMAL UPPER ARM
LOCATION DETAILED: RIGHT POSTERIOR SHOULDER
LOCATION DETAILED: STERNAL NOTCH
LOCATION DETAILED: LEFT CENTRAL MALAR CHEEK
LOCATION DETAILED: RIGHT CENTRAL MALAR CHEEK
LOCATION DETAILED: LEFT ANTERIOR PROXIMAL UPPER ARM
LOCATION DETAILED: LEFT CLAVICULAR NECK
LOCATION DETAILED: INFERIOR THORACIC SPINE
LOCATION DETAILED: SUPERIOR THORACIC SPINE

## 2025-02-26 ASSESSMENT — LOCATION ZONE DERM
LOCATION ZONE: TRUNK
LOCATION ZONE: NECK
LOCATION ZONE: FACE
LOCATION ZONE: ARM

## 2025-02-26 NOTE — PROCEDURE: DEFER
Instructions (Optional): Area is raised and catching/ rubbing on clothes. Needs separate procedure appt to remove
Introduction Text (Please End With A Colon): The following procedure was deferred:
Size Of Lesion In Cm (Optional): 0
Procedure To Be Performed At Next Visit: Biopsy by shave method
Detail Level: Detailed

## 2025-02-26 NOTE — PROCEDURE: BIOPSY BY SHAVE METHOD
Detail Level: Detailed
Depth Of Biopsy: dermis
Was A Bandage Applied: Yes
Size Of Lesion In Cm: 0.6
X Size Of Lesion In Cm: 0
Biopsy Type: H and E
Biopsy Method: Dermablade
Anesthesia Type: 1% lidocaine with epinephrine
Anesthesia Volume In Cc: 0.5
Hemostasis: Drysol
Wound Care: Petrolatum
Dressing: bandage
Destruction After The Procedure: No
Type Of Destruction Used: Curettage
Curettage Text: The wound bed was treated with curettage after the biopsy was performed.
Cryotherapy Text: The wound bed was treated with cryotherapy after the biopsy was performed.
Electrodesiccation Text: The wound bed was treated with electrodesiccation after the biopsy was performed.
Electrodesiccation And Curettage Text: The wound bed was treated with electrodesiccation and curettage after the biopsy was performed.
Silver Nitrate Text: The wound bed was treated with silver nitrate after the biopsy was performed.
Lab: [id number]
Lab Facility: [id number]
Medical Necessity Information: It is in your best interest to select a reason for this procedure from the list below. All of these items fulfill various CMS LCD requirements except the new and changing color options.
Consent: Written consent was obtained and risks were reviewed including but not limited to scarring, infection, bleeding, scabbing, incomplete removal, nerve damage and allergy to anesthesia.
Post-Care Instructions: I reviewed with the patient in detail post-care instructions. Patient is to keep the biopsy site dry overnight, and then apply bacitracin twice daily until healed. Patient may apply hydrogen peroxide soaks to remove any crusting.
Notification Instructions: Patient will be notified of biopsy results. However, patient instructed to call the office if not contacted within 2 weeks.
Billing Type: Third-Party Bill
Information: Selecting Yes will display possible errors in your note based on the variables you have selected. This validation is only offered as a suggestion for you. PLEASE NOTE THAT THE VALIDATION TEXT WILL BE REMOVED WHEN YOU FINALIZE YOUR NOTE. IF YOU WANT TO FAX A PRELIMINARY NOTE YOU WILL NEED TO TOGGLE THIS TO 'NO' IF YOU DO NOT WANT IT IN YOUR FAXED NOTE.

## 2025-03-02 ASSESSMENT — PATIENT HEALTH QUESTIONNAIRE - PHQ9
SUM OF ALL RESPONSES TO PHQ QUESTIONS 1-9: 0
1. LITTLE INTEREST OR PLEASURE IN DOING THINGS: NOT AT ALL
SUM OF ALL RESPONSES TO PHQ QUESTIONS 1-9: 0
2. FEELING DOWN, DEPRESSED OR HOPELESS: NOT AT ALL
2. FEELING DOWN, DEPRESSED OR HOPELESS: NOT AT ALL
SUM OF ALL RESPONSES TO PHQ QUESTIONS 1-9: 0
SUM OF ALL RESPONSES TO PHQ9 QUESTIONS 1 & 2: 0
SUM OF ALL RESPONSES TO PHQ QUESTIONS 1-9: 0
1. LITTLE INTEREST OR PLEASURE IN DOING THINGS: NOT AT ALL

## 2025-03-04 ENCOUNTER — OFFICE VISIT (OUTPATIENT)
Dept: INTERNAL MEDICINE CLINIC | Facility: CLINIC | Age: 60
End: 2025-03-04
Payer: COMMERCIAL

## 2025-03-04 VITALS
WEIGHT: 262 LBS | BODY MASS INDEX: 43.65 KG/M2 | HEIGHT: 65 IN | SYSTOLIC BLOOD PRESSURE: 124 MMHG | DIASTOLIC BLOOD PRESSURE: 80 MMHG | HEART RATE: 83 BPM

## 2025-03-04 DIAGNOSIS — I48.11 LONGSTANDING PERSISTENT ATRIAL FIBRILLATION (HCC): Primary | ICD-10-CM

## 2025-03-04 DIAGNOSIS — K51.919 ULCERATIVE COLITIS WITH COMPLICATION, UNSPECIFIED LOCATION (HCC): ICD-10-CM

## 2025-03-04 DIAGNOSIS — Z90.722 S/P TAH-BSO: Chronic | ICD-10-CM

## 2025-03-04 DIAGNOSIS — Z86.711 HISTORY OF PULMONARY EMBOLUS (PE): ICD-10-CM

## 2025-03-04 DIAGNOSIS — E78.00 HYPERCHOLESTEROLEMIA: ICD-10-CM

## 2025-03-04 DIAGNOSIS — Z90.710 S/P TAH-BSO: Chronic | ICD-10-CM

## 2025-03-04 DIAGNOSIS — G47.33 OSA (OBSTRUCTIVE SLEEP APNEA): ICD-10-CM

## 2025-03-04 DIAGNOSIS — Z90.79 S/P TAH-BSO: Chronic | ICD-10-CM

## 2025-03-04 DIAGNOSIS — Z12.31 ENCOUNTER FOR SCREENING MAMMOGRAM FOR MALIGNANT NEOPLASM OF BREAST: Chronic | ICD-10-CM

## 2025-03-04 DIAGNOSIS — Z80.0 FAMILY HISTORY OF COLON CANCER IN FATHER: Chronic | ICD-10-CM

## 2025-03-04 DIAGNOSIS — Z23 ENCOUNTER FOR IMMUNIZATION: Chronic | ICD-10-CM

## 2025-03-04 PROCEDURE — 99214 OFFICE O/P EST MOD 30 MIN: CPT | Performed by: INTERNAL MEDICINE

## 2025-03-04 RX ORDER — ATORVASTATIN CALCIUM 40 MG/1
40 TABLET, FILM COATED ORAL DAILY
Qty: 90 TABLET | Refills: 1 | Status: SHIPPED | OUTPATIENT
Start: 2025-03-04

## 2025-03-04 SDOH — ECONOMIC STABILITY: FOOD INSECURITY: WITHIN THE PAST 12 MONTHS, THE FOOD YOU BOUGHT JUST DIDN'T LAST AND YOU DIDN'T HAVE MONEY TO GET MORE.: NEVER TRUE

## 2025-03-04 SDOH — ECONOMIC STABILITY: FOOD INSECURITY: WITHIN THE PAST 12 MONTHS, YOU WORRIED THAT YOUR FOOD WOULD RUN OUT BEFORE YOU GOT MONEY TO BUY MORE.: NEVER TRUE

## 2025-03-04 ASSESSMENT — ENCOUNTER SYMPTOMS
EYE PAIN: 0
RECTAL PAIN: 0
STRIDOR: 0
VOICE CHANGE: 0

## 2025-03-04 NOTE — PROGRESS NOTES
icterus.     Extraocular Movements: Extraocular movements intact.      Pupils: Pupils are equal, round, and reactive to light.   Cardiovascular:      Rate and Rhythm: Normal rate and regular rhythm.      Pulses: Normal pulses.      Heart sounds: Normal heart sounds.   Pulmonary:      Effort: Pulmonary effort is normal. No respiratory distress.      Breath sounds: Normal breath sounds. No stridor.   Abdominal:      General: Abdomen is flat. Bowel sounds are normal.      Palpations: Abdomen is soft. There is no mass.      Tenderness: There is no guarding or rebound.   Musculoskeletal:         General: Normal range of motion.      Cervical back: Normal range of motion and neck supple.   Skin:     General: Skin is warm and dry.      Coloration: Skin is not jaundiced.   Neurological:      Mental Status: She is alert and oriented to person, place, and time. Mental status is at baseline.   Psychiatric:         Behavior: Behavior normal.         Thought Content: Thought content normal.              Orders Only on 02/26/2025   Component Date Value Ref Range Status    Protime 02/26/2025 26.9 (H)  11.3 - 14.9 sec Final    PLEASE NOTE NEW REFERENCE RANGE    INR 02/26/2025 2.3    Final    Comment: Suggested therapeutic INR range:  Venous thrombosis and embolus  2.0-3.0  Prosthetic heart valve         2.5-3.5  ** Note new reference range and method **      WBC 02/26/2025 5.3  4.3 - 11.1 K/uL Final    RBC 02/26/2025 4.59  4.05 - 5.2 M/uL Final    Hemoglobin 02/26/2025 13.6  11.7 - 15.4 g/dL Final    Hematocrit 02/26/2025 41.7  35.8 - 46.3 % Final    MCV 02/26/2025 90.8  82 - 102 FL Final    MCH 02/26/2025 29.6  26.1 - 32.9 PG Final    MCHC 02/26/2025 32.6  31.4 - 35.0 g/dL Final    RDW 02/26/2025 13.4  11.9 - 14.6 % Final    Platelets 02/26/2025 191  150 - 450 K/uL Final    MPV 02/26/2025 11.4  9.4 - 12.3 FL Final    nRBC 02/26/2025 0.00  0.0 - 0.2 K/uL Final    **Note: Absolute NRBC parameter is now reported with Hemogram**

## 2025-03-04 NOTE — RESULT ENCOUNTER NOTE
Patient's INR remains therapeutic on current warfarin 7.5 mg Mon-Wed-Fri and 5 mg other days.  Repeat one month.  Patient aware.

## 2025-03-09 DIAGNOSIS — I48.11 LONGSTANDING PERSISTENT ATRIAL FIBRILLATION (HCC): ICD-10-CM

## 2025-03-09 DIAGNOSIS — Z86.711 HISTORY OF PULMONARY EMBOLUS (PE): ICD-10-CM

## 2025-03-10 RX ORDER — WARFARIN SODIUM 5 MG/1
5 TABLET ORAL DAILY
Qty: 90 TABLET | Refills: 0 | Status: SHIPPED | OUTPATIENT
Start: 2025-03-10

## 2025-03-12 ENCOUNTER — RESULTS FOLLOW-UP (OUTPATIENT)
Age: 60
End: 2025-03-12

## 2025-03-12 NOTE — TELEPHONE ENCOUNTER
----- Message from Dr. Bolivar Dominguez MD sent at 3/12/2025  1:52 PM EDT -----  Please let patient know that there echo mildly reduced heart function.  Needs follow-up in the next few weeks to discuss

## 2025-03-12 NOTE — TELEPHONE ENCOUNTER
Called patient and informed her of echo results. Scheduling will call patient with follow up in a few weeks

## 2025-04-01 DIAGNOSIS — Z86.711 HISTORY OF PULMONARY EMBOLUS (PE): ICD-10-CM

## 2025-04-01 DIAGNOSIS — I48.11 LONGSTANDING PERSISTENT ATRIAL FIBRILLATION (HCC): ICD-10-CM

## 2025-04-01 DIAGNOSIS — Z79.01 LONG TERM CURRENT USE OF ANTICOAGULANT: ICD-10-CM

## 2025-04-01 LAB
INR PPP: 2.7
PROTHROMBIN TIME: 30.5 SEC (ref 11.3–14.9)

## 2025-04-09 ENCOUNTER — APPOINTMENT (OUTPATIENT)
Dept: URBAN - METROPOLITAN AREA CLINIC 24 | Facility: CLINIC | Age: 60
Setting detail: DERMATOLOGY
End: 2025-04-09

## 2025-04-09 DIAGNOSIS — D485 NEOPLASM OF UNCERTAIN BEHAVIOR OF SKIN: ICD-10-CM

## 2025-04-09 PROBLEM — D48.5 NEOPLASM OF UNCERTAIN BEHAVIOR OF SKIN: Status: ACTIVE | Noted: 2025-04-09

## 2025-04-09 PROCEDURE — ? BIOPSY BY SHAVE METHOD

## 2025-04-09 PROCEDURE — 11102 TANGNTL BX SKIN SINGLE LES: CPT

## 2025-04-09 ASSESSMENT — LOCATION DETAILED DESCRIPTION DERM: LOCATION DETAILED: LEFT INFERIOR LATERAL NECK

## 2025-04-09 ASSESSMENT — LOCATION ZONE DERM: LOCATION ZONE: NECK

## 2025-04-09 ASSESSMENT — LOCATION SIMPLE DESCRIPTION DERM: LOCATION SIMPLE: LEFT ANTERIOR NECK

## 2025-04-09 NOTE — PROCEDURE: BIOPSY BY SHAVE METHOD
Detail Level: Detailed
Depth Of Biopsy: dermis
Was A Bandage Applied: Yes
Size Of Lesion In Cm: 0
Biopsy Type: H and E
Biopsy Method: Dermablade
Anesthesia Type: 1% lidocaine with epinephrine
Anesthesia Volume In Cc: 0.5
Hemostasis: Drysol
Wound Care: Petrolatum
Dressing: bandage
Destruction After The Procedure: No
Type Of Destruction Used: Curettage
Curettage Text: The wound bed was treated with curettage after the biopsy was performed.
Cryotherapy Text: The wound bed was treated with cryotherapy after the biopsy was performed.
Electrodesiccation Text: The wound bed was treated with electrodesiccation after the biopsy was performed.
Electrodesiccation And Curettage Text: The wound bed was treated with electrodesiccation and curettage after the biopsy was performed.
Silver Nitrate Text: The wound bed was treated with silver nitrate after the biopsy was performed.
Lab: [id number]
Lab Facility: [id number]
Medical Necessity Information: It is in your best interest to select a reason for this procedure from the list below. All of these items fulfill various CMS LCD requirements except the new and changing color options.
Consent: Written consent was obtained and risks were reviewed including but not limited to scarring, infection, bleeding, scabbing, incomplete removal, nerve damage and allergy to anesthesia.
Post-Care Instructions: I reviewed with the patient in detail post-care instructions. Patient is to keep the biopsy site dry overnight, and then apply bacitracin twice daily until healed. Patient may apply hydrogen peroxide soaks to remove any crusting.
Notification Instructions: Patient will be notified of biopsy results. However, patient instructed to call the office if not contacted within 2 weeks.
Billing Type: Third-Party Bill
Information: Selecting Yes will display possible errors in your note based on the variables you have selected. This validation is only offered as a suggestion for you. PLEASE NOTE THAT THE VALIDATION TEXT WILL BE REMOVED WHEN YOU FINALIZE YOUR NOTE. IF YOU WANT TO FAX A PRELIMINARY NOTE YOU WILL NEED TO TOGGLE THIS TO 'NO' IF YOU DO NOT WANT IT IN YOUR FAXED NOTE.

## 2025-04-09 NOTE — PROCEDURE: MIPS QUALITY
Health Maintenance Due   Topic Date Due   • Depression Screening  02/11/2021       Patient is up to date, no discussion needed.        
Quality 226: Preventive Care And Screening: Tobacco Use: Screening And Cessation Intervention: Patient screened for tobacco use and is an ex/non-smoker
Detail Level: Detailed

## 2025-04-21 ENCOUNTER — RESULTS FOLLOW-UP (OUTPATIENT)
Dept: INTERNAL MEDICINE CLINIC | Facility: CLINIC | Age: 60
End: 2025-04-21

## 2025-04-22 ENCOUNTER — OFFICE VISIT (OUTPATIENT)
Age: 60
End: 2025-04-22
Payer: COMMERCIAL

## 2025-04-22 VITALS
DIASTOLIC BLOOD PRESSURE: 88 MMHG | BODY MASS INDEX: 44.32 KG/M2 | HEART RATE: 77 BPM | HEIGHT: 65 IN | WEIGHT: 266 LBS | SYSTOLIC BLOOD PRESSURE: 140 MMHG

## 2025-04-22 DIAGNOSIS — I48.91 ATRIAL FIBRILLATION, UNSPECIFIED TYPE (HCC): ICD-10-CM

## 2025-04-22 DIAGNOSIS — I48.11 LONGSTANDING PERSISTENT ATRIAL FIBRILLATION (HCC): ICD-10-CM

## 2025-04-22 DIAGNOSIS — Z86.711 HISTORY OF PULMONARY EMBOLUS (PE): Primary | ICD-10-CM

## 2025-04-22 DIAGNOSIS — R06.02 SHORTNESS OF BREATH: Primary | ICD-10-CM

## 2025-04-22 DIAGNOSIS — Z79.01 LONG TERM CURRENT USE OF ANTICOAGULANT: ICD-10-CM

## 2025-04-22 PROCEDURE — 99214 OFFICE O/P EST MOD 30 MIN: CPT | Performed by: INTERNAL MEDICINE

## 2025-04-22 ASSESSMENT — ENCOUNTER SYMPTOMS
SHORTNESS OF BREATH: 1
ABDOMINAL PAIN: 0

## 2025-04-22 NOTE — TELEPHONE ENCOUNTER
----- Message from Krissy MARTIN sent at 4/22/2025  9:37 AM EDT -----  Please call the patient.  ----- Message -----  From: Jae Tripathi MD  Sent: 4/21/2025  10:13 PM EDT  To: Kiersten Fonseca MA    Call patient.  Her INR two weeks ago was fine.  Repeat INR one month (from that last draw).

## 2025-04-22 NOTE — PROGRESS NOTES
[facility] CARDIOLOGY  [address] [place] [address], [address]  [place] [address]  PHONE: [phone]      25    NAME:  Mariluz Rodriguez  : 1965  MRN: [id number]         SUBJECTIVE:   Mariluz Rodriguez is a 59 y.o. female seen for a follow up visit regarding the following:     Chief Complaint   Patient presents with    Atrial Fibrillation    Shortness of Breath            HPI:  Follow up  Atrial Fibrillation and Shortness of Breath   .    Ms. Rodriguez presents today for follow-up.  We reviewed her echo which shows mildly reduced ejection fraction of 45 to 50%.  No hemodynamically significant valve disease.  Her breathlessness persists.  She did not have any regional wall motion abnormalities consistent with prior infarct.  No other acute complaints today    Atrial Fibrillation  Symptoms include shortness of breath.   Shortness of Breath  Pertinent negatives include no abdominal pain or fever.           Cardiac Medications       Beta Blockers Cardio-Selective       metoprolol succinate (TOPROL XL) 50 MG extended release tablet Take 1 tablet by mouth daily       HMG CoA Reductase Inhibitors       atorvastatin (LIPITOR) 40 MG tablet Take 1 tablet by mouth daily       Cardiovascular Agents Misc. - Combinations       sacubitril-valsartan (ENTRESTO) 24-26 MG per tablet Take 1 tablet by mouth 2 times daily       Coumarin Anticoagulants       warfarin (COUMADIN) 5 MG tablet Take 1 tablet by mouth once daily     warfarin (COUMADIN) 7.5 MG tablet Take 1 tablet by mouth daily                  Past Medical History, Past Surgical History, Family history, Social History, and Medications were all reviewed with the patient today and updated as necessary.     Prior to Admission medications    Medication Sig Start Date End Date Taking? Authorizing Provider   sacubitril-valsartan (ENTRESTO) 24-26 MG per tablet Take 1 tablet by mouth 2 times daily 25  Yes Bolivar Dominguez III, MD   warfarin (COUMADIN) 5 MG tablet Take 1

## 2025-04-23 ENCOUNTER — PATIENT MESSAGE (OUTPATIENT)
Age: 60
End: 2025-04-23

## 2025-04-25 NOTE — TELEPHONE ENCOUNTER
Requested Prescriptions     Pending Prescriptions Disp Refills    valsartan (DIOVAN) 80 MG tablet 90 tablet 1     Sig: Take 1 tablet by mouth daily

## 2025-04-30 RX ORDER — VALSARTAN 80 MG/1
80 TABLET ORAL DAILY
Qty: 90 TABLET | Refills: 1 | Status: SHIPPED | OUTPATIENT
Start: 2025-04-30

## 2025-05-02 DIAGNOSIS — Z79.01 LONG TERM CURRENT USE OF ANTICOAGULANT: ICD-10-CM

## 2025-05-02 DIAGNOSIS — I48.11 LONGSTANDING PERSISTENT ATRIAL FIBRILLATION (HCC): ICD-10-CM

## 2025-05-02 DIAGNOSIS — E78.00 HYPERCHOLESTEROLEMIA: ICD-10-CM

## 2025-05-02 DIAGNOSIS — Z86.711 HISTORY OF PULMONARY EMBOLUS (PE): ICD-10-CM

## 2025-05-02 LAB
ALT SERPL-CCNC: 21 U/L (ref 8–45)
AST SERPL-CCNC: 24 U/L (ref 15–37)
CHOLEST SERPL-MCNC: 157 MG/DL (ref 0–200)
HDLC SERPL-MCNC: 53 MG/DL (ref 40–60)
HDLC SERPL: 3 (ref 0–5)
INR PPP: 2.4
LDLC SERPL CALC-MCNC: 85 MG/DL (ref 0–100)
PROTHROMBIN TIME: 26.4 SEC (ref 11.3–14.9)
TRIGL SERPL-MCNC: 95 MG/DL (ref 0–150)
VLDLC SERPL CALC-MCNC: 19 MG/DL (ref 6–23)

## 2025-05-03 SDOH — ECONOMIC STABILITY: FOOD INSECURITY: WITHIN THE PAST 12 MONTHS, THE FOOD YOU BOUGHT JUST DIDN'T LAST AND YOU DIDN'T HAVE MONEY TO GET MORE.: NEVER TRUE

## 2025-05-03 SDOH — ECONOMIC STABILITY: FOOD INSECURITY: WITHIN THE PAST 12 MONTHS, YOU WORRIED THAT YOUR FOOD WOULD RUN OUT BEFORE YOU GOT MONEY TO BUY MORE.: NEVER TRUE

## 2025-05-03 SDOH — ECONOMIC STABILITY: TRANSPORTATION INSECURITY
IN THE PAST 12 MONTHS, HAS THE LACK OF TRANSPORTATION KEPT YOU FROM MEDICAL APPOINTMENTS OR FROM GETTING MEDICATIONS?: NO

## 2025-05-03 SDOH — ECONOMIC STABILITY: TRANSPORTATION INSECURITY
IN THE PAST 12 MONTHS, HAS LACK OF TRANSPORTATION KEPT YOU FROM MEETINGS, WORK, OR FROM GETTING THINGS NEEDED FOR DAILY LIVING?: NO

## 2025-05-03 SDOH — ECONOMIC STABILITY: INCOME INSECURITY: IN THE LAST 12 MONTHS, WAS THERE A TIME WHEN YOU WERE NOT ABLE TO PAY THE MORTGAGE OR RENT ON TIME?: NO

## 2025-05-06 ENCOUNTER — OFFICE VISIT (OUTPATIENT)
Dept: INTERNAL MEDICINE CLINIC | Facility: CLINIC | Age: 60
End: 2025-05-06
Payer: COMMERCIAL

## 2025-05-06 ENCOUNTER — RESULTS FOLLOW-UP (OUTPATIENT)
Dept: INTERNAL MEDICINE CLINIC | Facility: CLINIC | Age: 60
End: 2025-05-06

## 2025-05-06 VITALS
HEIGHT: 65 IN | DIASTOLIC BLOOD PRESSURE: 80 MMHG | SYSTOLIC BLOOD PRESSURE: 140 MMHG | WEIGHT: 265.6 LBS | OXYGEN SATURATION: 99 % | BODY MASS INDEX: 44.25 KG/M2 | HEART RATE: 82 BPM

## 2025-05-06 DIAGNOSIS — G47.33 OSA (OBSTRUCTIVE SLEEP APNEA): ICD-10-CM

## 2025-05-06 DIAGNOSIS — E78.00 HYPERCHOLESTEROLEMIA: Primary | ICD-10-CM

## 2025-05-06 DIAGNOSIS — I48.11 LONGSTANDING PERSISTENT ATRIAL FIBRILLATION (HCC): ICD-10-CM

## 2025-05-06 DIAGNOSIS — R03.0 ELEVATED BLOOD PRESSURE READING: ICD-10-CM

## 2025-05-06 PROCEDURE — 99214 OFFICE O/P EST MOD 30 MIN: CPT | Performed by: INTERNAL MEDICINE

## 2025-05-06 ASSESSMENT — ENCOUNTER SYMPTOMS
RECTAL PAIN: 0
EYE PAIN: 0
VOICE CHANGE: 0
STRIDOR: 0

## 2025-05-06 NOTE — PROGRESS NOTES
FOLLOWUP VISIT    Subjective:    Ms. Rodriguez is a 59 y.o., female,   Chief Complaint   Patient presents with    Follow-up     2 months        HPI:    Patient presents today for follow up of two or more chronic medical problems and review of labs.       The patient has hyperlipidemia.  The patient has been attempting to follow a low cholesterol diet and denies any myalgias or weakness on current lipid lowering therapy.       The patient has hypertension.  The patient has been on an attempted low sodium diet and has been trying to exercise and maintain a healthy weight.  The patient reports good compliance with the blood pressure medications.       She has untreated MARY.  Previously refused CPAP.      The following portions of the patient's history were reviewed and updated as appropriate:      Past Medical History:   Diagnosis Date    Atrial fibrillation (HCC)     History of pulmonary embolus (PE)     Morbid obesity (HCC)     MARY (obstructive sleep apnea)     Ulcerative colitis (HCC)        Past Surgical History:   Procedure Laterality Date    COLONOSCOPY      COLONOSCOPY N/A 5/1/2024    COLONOSCOPY BIOPSY performed by Liborio Huston MD at [facility] [place]    EDWIN AND BSO (CERVIX REMOVED)      pathology benign       Family History   Problem Relation Age of Onset    Seizures Mother     Colon Cancer Father     Coronary Art Dis Neg Hx        Social History     Socioeconomic History    Marital status:      Spouse name: Not on file    Number of children: 4    Years of education: Not on file    Highest education level: Not on file   Occupational History     Comment: employed at [employer]   Tobacco Use    Smoking status: Former     Types: Cigarettes     Passive exposure: Past    Smokeless tobacco: Never    Tobacco comments:     1 ppd x 30 yrs.  Quit age 48.     Substance and Sexual Activity    Alcohol use: Yes     Comment: one glass of wine daily    Drug use: Never    Sexual activity: Not on file   Other Topics Concern

## 2025-05-08 DIAGNOSIS — K51.919 ULCERATIVE COLITIS WITH COMPLICATION, UNSPECIFIED LOCATION (HCC): Primary | ICD-10-CM

## 2025-05-08 DIAGNOSIS — K51.219 ULCERATIVE PROCTITIS WITH COMPLICATION (HCC): ICD-10-CM

## 2025-05-08 RX ORDER — MESALAMINE 1.2 G/1
TABLET, DELAYED RELEASE ORAL
Qty: 360 TABLET | Refills: 1 | Status: SHIPPED | OUTPATIENT
Start: 2025-05-08

## 2025-05-19 RX ORDER — METOPROLOL SUCCINATE 50 MG/1
50 TABLET, EXTENDED RELEASE ORAL DAILY
Qty: 90 TABLET | Refills: 0 | Status: SHIPPED | OUTPATIENT
Start: 2025-05-19

## 2025-05-28 DIAGNOSIS — R19.7 DIARRHEA, UNSPECIFIED TYPE: Primary | ICD-10-CM

## 2025-05-28 RX ORDER — DIPHENOXYLATE HYDROCHLORIDE AND ATROPINE SULFATE 2.5; .025 MG/1; MG/1
1 TABLET ORAL 4 TIMES DAILY PRN
Qty: 120 TABLET | Refills: 0 | Status: SHIPPED | OUTPATIENT
Start: 2025-05-28 | End: 2025-06-27

## 2025-06-02 DIAGNOSIS — R19.7 DIARRHEA, UNSPECIFIED TYPE: ICD-10-CM

## 2025-06-02 DIAGNOSIS — Z79.01 LONG TERM CURRENT USE OF ANTICOAGULANT: ICD-10-CM

## 2025-06-02 DIAGNOSIS — Z86.711 HISTORY OF PULMONARY EMBOLUS (PE): ICD-10-CM

## 2025-06-02 DIAGNOSIS — I48.11 LONGSTANDING PERSISTENT ATRIAL FIBRILLATION (HCC): ICD-10-CM

## 2025-06-02 LAB
CRP SERPL-MCNC: 0.9 MG/DL (ref 0–0.4)
ERYTHROCYTE [SEDIMENTATION RATE] IN BLOOD: 8 MM/HR (ref 0–30)
INR PPP: 2.7
PROTHROMBIN TIME: 28.7 SEC (ref 11.3–14.9)

## 2025-06-03 ENCOUNTER — RESULTS FOLLOW-UP (OUTPATIENT)
Dept: GASTROENTEROLOGY | Age: 60
End: 2025-06-03

## 2025-06-03 LAB
ADV 40+41 DNA STL QL NAA+NON-PROBE: NOT DETECTED
ASTRO TYP 1-8 RNA STL QL NAA+NON-PROBE: NOT DETECTED
C CAYETANENSIS DNA STL QL NAA+NON-PROBE: NOT DETECTED
C COLI+JEJ+UPSA DNA STL QL NAA+NON-PROBE: NOT DETECTED
C DIF TOX TCDA+TCDB STL QL NAA+NON-PROBE: DETECTED
CRYPTOSP DNA STL QL NAA+NON-PROBE: NOT DETECTED
E COLI O157 DNA STL QL NAA+NON-PROBE: ABNORMAL
E HISTOLYT DNA STL QL NAA+NON-PROBE: NOT DETECTED
EAEC PAA PLAS AGGR+AATA ST NAA+NON-PRB: NOT DETECTED
EC STX1+STX2 GENES STL QL NAA+NON-PROBE: NOT DETECTED
EPEC EAE GENE STL QL NAA+NON-PROBE: NOT DETECTED
ETEC LTA+ST1A+ST1B TOX ST NAA+NON-PROBE: NOT DETECTED
G LAMBLIA DNA STL QL NAA+NON-PROBE: NOT DETECTED
NOROVIRUS GI+II RNA STL QL NAA+NON-PROBE: NOT DETECTED
P SHIGELLOIDES DNA STL QL NAA+NON-PROBE: NOT DETECTED
RVA RNA STL QL NAA+NON-PROBE: NOT DETECTED
S ENT+BONG DNA STL QL NAA+NON-PROBE: NOT DETECTED
SAPO I+II+IV+V RNA STL QL NAA+NON-PROBE: NOT DETECTED
SHIGELLA SP+EIEC IPAH ST NAA+NON-PROBE: NOT DETECTED
SPECIMEN SOURCE: ABNORMAL
V CHOL+PARA+VUL DNA STL QL NAA+NON-PROBE: NOT DETECTED
V CHOLERAE DNA STL QL NAA+NON-PROBE: NOT DETECTED
Y ENTEROCOL DNA STL QL NAA+NON-PROBE: NOT DETECTED

## 2025-06-04 ENCOUNTER — TELEPHONE (OUTPATIENT)
Age: 60
End: 2025-06-04

## 2025-06-04 ENCOUNTER — PREP FOR PROCEDURE (OUTPATIENT)
Dept: GASTROENTEROLOGY | Age: 60
End: 2025-06-04

## 2025-06-04 ENCOUNTER — RESULTS FOLLOW-UP (OUTPATIENT)
Dept: INTERNAL MEDICINE CLINIC | Facility: CLINIC | Age: 60
End: 2025-06-04

## 2025-06-04 DIAGNOSIS — Z86.711 HISTORY OF PULMONARY EMBOLUS (PE): ICD-10-CM

## 2025-06-04 DIAGNOSIS — Z87.19 HISTORY OF IBS: ICD-10-CM

## 2025-06-04 DIAGNOSIS — I48.11 LONGSTANDING PERSISTENT ATRIAL FIBRILLATION (HCC): ICD-10-CM

## 2025-06-04 DIAGNOSIS — A49.8 CLOSTRIDIUM DIFFICILE INFECTION: Primary | ICD-10-CM

## 2025-06-04 DIAGNOSIS — Z79.01 LONG TERM CURRENT USE OF ANTICOAGULANT: Primary | ICD-10-CM

## 2025-06-04 LAB — CALPROTECTIN STL-MCNT: 369 UG/G (ref 0–120)

## 2025-06-04 RX ORDER — SODIUM CHLORIDE 0.9 % (FLUSH) 0.9 %
5-40 SYRINGE (ML) INJECTION PRN
Status: CANCELLED | OUTPATIENT
Start: 2025-06-04

## 2025-06-04 RX ORDER — VANCOMYCIN HYDROCHLORIDE 125 MG/1
125 CAPSULE ORAL 4 TIMES DAILY
Qty: 56 CAPSULE | Refills: 0 | Status: SHIPPED | OUTPATIENT
Start: 2025-06-04 | End: 2025-06-04

## 2025-06-04 RX ORDER — SODIUM CHLORIDE 9 MG/ML
25 INJECTION, SOLUTION INTRAVENOUS PRN
Status: CANCELLED | OUTPATIENT
Start: 2025-06-04

## 2025-06-04 RX ORDER — WARFARIN SODIUM 5 MG/1
5 TABLET ORAL DAILY
Qty: 90 TABLET | Refills: 0 | Status: SHIPPED | OUTPATIENT
Start: 2025-06-04

## 2025-06-04 RX ORDER — SODIUM CHLORIDE 0.9 % (FLUSH) 0.9 %
5-40 SYRINGE (ML) INJECTION EVERY 12 HOURS SCHEDULED
Status: CANCELLED | OUTPATIENT
Start: 2025-06-04

## 2025-06-04 NOTE — TELEPHONE ENCOUNTER
Requested Prescriptions     Pending Prescriptions Disp Refills    warfarin (COUMADIN) 5 MG tablet [Pharmacy Med Name: Warfarin Sodium 5 MG Oral Tablet] 90 tablet 0     Sig: Take 1 tablet by mouth once daily     Dose verified and to Tomy. Patient is scheduled for follow up visit.

## 2025-06-04 NOTE — PROGRESS NOTES
Patient verified name, , and procedure.    Type: 1A; abbreviated assessment per anesthesia guidelines.    Labs per anesthesia: POCT PT/INR- held for DOS.  EKG: held for DOS, per anesthesia guidelines.     Instructed pt that they will be notified the day before their procedure by the GI Lab for time of arrival if their procedure is [place] and Pre-op for [place] cases. Arrival times should be called by 5 pm. If no phone is received the patient should contact their respective hospital. The GI lab telephone number is [phone] and ES Pre-op is [phone].     Follow diet and prep instructions per office, including NPO status.      Bath or shower the night before and the am of surgery with non-moisturizing soap. No lotions, oils, powders, perfumes, or cologne on skin. No make up, eye make up or jewelry. Wear loose fitting comfortable, clean clothing.     Must have adult present in building the entire time .     Medications to take on the day of procedure: Atorvastatin, Metoprolol, Mesalamine, per anesthesia guidelines.    Medications to hold: None, per anesthesia guidelines.     Patient instructed to hold all vitamins/supplements 7 days prior to surgery and NSAIDS 5 days prior to surgery. Verbalized understanding.     Pt is taking a blood thinner/ anticoagulant: Warfarin.   General, local & regional anesthesia: The surgeon will advise you on whether you will continue this medication or hold this medication prior to surgery. If you have not been advised, you should contact your surgeon.    The following discharge instructions reviewed with patient: medication given during procedure may cause drowsiness for several hours, therefore, do not drive or operate machinery for remainder of the day. You may not drink alcohol on the day of your procedure, please resume regular diet and activity unless otherwise directed. You may experience abdominal distention for several hours that is relieved by the passage of gas. Contact

## 2025-06-04 NOTE — TELEPHONE ENCOUNTER
Cardiac Clearance        Physician or Practice Requesting:[facility]   :    Contact Phone Number: [phone]  Fax Number: [phone]  Date of Surgery/Procedure: 06/12/25  Type of Surgery or Procedure: Colonoscopy   Type of Anesthesia:   Type of Clearance Requested: risk assessment and any medication hold   Medication to Hold:Coumadin   Days to Hold: 5 day hold

## 2025-06-04 NOTE — RESULT ENCOUNTER NOTE
Call patient.  Her INR is therapeutic.  Continue current dose of warfarin and repeat INR in 1 month.

## 2025-06-04 NOTE — TELEPHONE ENCOUNTER
Our office does not manage patients INR. Warfarin hold will need to come from Dr. Tripathi, patients pcp

## 2025-06-04 NOTE — TELEPHONE ENCOUNTER
----- Message from Dr. Liborio Huston MD sent at 6/4/2025  8:48 AM EDT -----  Please schedule patient for a f/u colonoscopy soon (history of IBD is the diagnosis ), and ensure they are taking their recommended medications correctly, there is worsening inflammation in the stool and we may need to change therapy based on findings. Thanks!

## 2025-06-05 NOTE — TELEPHONE ENCOUNTER
----- Message from Dr. Jae Tripathi MD sent at 6/4/2025  7:47 PM EDT -----  Call patient.  Her INR is therapeutic.  Continue current dose of warfarin and repeat INR in 1 month.

## 2025-06-06 DIAGNOSIS — K51.919 ULCERATIVE COLITIS WITH COMPLICATION, UNSPECIFIED LOCATION (HCC): ICD-10-CM

## 2025-06-06 DIAGNOSIS — K51.219 ULCERATIVE PROCTITIS WITH COMPLICATION (HCC): ICD-10-CM

## 2025-06-06 RX ORDER — DICYCLOMINE HCL 20 MG
20 TABLET ORAL
Qty: 120 TABLET | Refills: 2 | Status: SHIPPED | OUTPATIENT
Start: 2025-06-06

## 2025-06-09 ENCOUNTER — TELEPHONE (OUTPATIENT)
Age: 60
End: 2025-06-09

## 2025-06-09 ENCOUNTER — PATIENT MESSAGE (OUTPATIENT)
Dept: INTERNAL MEDICINE CLINIC | Facility: CLINIC | Age: 60
End: 2025-06-09

## 2025-06-09 DIAGNOSIS — Z12.11 ENCOUNTER FOR SCREENING COLONOSCOPY: Primary | ICD-10-CM

## 2025-06-09 DIAGNOSIS — A49.8 CLOSTRIDIUM DIFFICILE INFECTION: ICD-10-CM

## 2025-06-09 RX ORDER — VANCOMYCIN HYDROCHLORIDE 125 MG/1
125 CAPSULE ORAL 4 TIMES DAILY
Qty: 56 CAPSULE | Refills: 0 | Status: SHIPPED | OUTPATIENT
Start: 2025-06-09 | End: 2025-06-23

## 2025-06-09 RX ORDER — SODIUM, POTASSIUM,MAG SULFATES 17.5-3.13G
1 SOLUTION, RECONSTITUTED, ORAL ORAL ONCE
Qty: 1 EACH | Refills: 0 | Status: SHIPPED | OUTPATIENT
Start: 2025-06-09 | End: 2025-06-09

## 2025-06-09 NOTE — TELEPHONE ENCOUNTER
Sent prep into patients pharmacy. Called and spoke with patient regarding medication (vancomycin) called in by  to treat C.Diff. Patient states she was unaware of medication and has not started it. Informed , and he has requested     \"Treat for 14 days, reschedule once they have completed treatment for the infection.\"    Informed patient she will need to complete treatment first then she can have the colonoscopy completed. Message sent to surgery scheduler to contact patient to reschedule procedure. Patient understood.

## 2025-06-11 ENCOUNTER — OFFICE VISIT (OUTPATIENT)
Dept: INTERNAL MEDICINE CLINIC | Facility: CLINIC | Age: 60
End: 2025-06-11
Payer: COMMERCIAL

## 2025-06-11 VITALS
SYSTOLIC BLOOD PRESSURE: 118 MMHG | HEART RATE: 63 BPM | BODY MASS INDEX: 42.59 KG/M2 | OXYGEN SATURATION: 98 % | HEIGHT: 66 IN | DIASTOLIC BLOOD PRESSURE: 70 MMHG | WEIGHT: 265 LBS

## 2025-06-11 DIAGNOSIS — Z01.818 ENCOUNTER FOR PREOPERATIVE ASSESSMENT: Primary | ICD-10-CM

## 2025-06-11 PROBLEM — R03.0 ELEVATED BLOOD PRESSURE READING: Status: RESOLVED | Noted: 2025-05-06 | Resolved: 2025-06-11

## 2025-06-11 PROCEDURE — 99213 OFFICE O/P EST LOW 20 MIN: CPT | Performed by: INTERNAL MEDICINE

## 2025-06-11 RX ORDER — SODIUM, POTASSIUM,MAG SULFATES 17.5-3.13G
SOLUTION, RECONSTITUTED, ORAL ORAL
COMMUNITY
Start: 2025-06-09

## 2025-06-11 ASSESSMENT — ENCOUNTER SYMPTOMS
STRIDOR: 0
VOICE CHANGE: 0
EYE PAIN: 0
RECTAL PAIN: 0

## 2025-06-11 NOTE — PROGRESS NOTES
FOLLOWUP VISIT    Subjective:    Ms. Rodriguez is a 59 y.o., female,   Chief Complaint   Patient presents with    Pre-op Exam       HPI:    Patient presents today for a preoperative medical evaluation at the request of Dr. Huston .  The patient is scheduled to undergo a colonoscopy under IV sedation on 7/11/25.  The patient otherwise feels well.  The patient denies chest pain, shortness of breath, PND, edema, orthopnea, or other active cardiac or pulmonary symptoms.  The patient's estimated functional capacity based on their description of day-to-day activities is > 4 METs.  She and her  walk in their neighborhood for exercise.  She does all the laundry and housework including carrying bags of groceries upstairs into the house without any cardiopulmonary symptoms.  The patient's other chronic medical problems as listed below are currently at their best compensated baseline.       She has been on warfarin since 2014.  She had an unprovoked DVT and PE.  She was treated initially with heparin then warfarin.  Afterwards she states she was referred to a hematologist and underwent testing for a genetic / hereditary reason for DVT / PE.  She states genetic testing was negative but physicians recommended that she remain on lifelong anticoagulation given no contraindication because her PE was extensive.  Over the years since she has had interrupted anticoagulation for procedures like colonoscopy without any difficulties.      The following portions of the patient's history were reviewed and updated as appropriate:      Past Medical History:   Diagnosis Date    Atrial fibrillation (HCC)     History of heart murmur in childhood     Resolved per pt    History of pulmonary embolus (PE)     Hx of blood clots     LE    Hyperlipidemia     Morbid obesity (HCC)     MARY (obstructive sleep apnea)     Does not wear CPAP; Awaiting sleep study on 06/16/2025    Ulcerative colitis (HCC)        Past Surgical History:   Procedure Laterality

## 2025-06-23 ENCOUNTER — PATIENT MESSAGE (OUTPATIENT)
Dept: INTERNAL MEDICINE CLINIC | Facility: CLINIC | Age: 60
End: 2025-06-23

## 2025-06-24 DIAGNOSIS — M25.551 RIGHT HIP PAIN: Primary | ICD-10-CM

## 2025-06-25 ENCOUNTER — OFFICE VISIT (OUTPATIENT)
Dept: ORTHOPEDIC SURGERY | Age: 60
End: 2025-06-25

## 2025-06-25 DIAGNOSIS — M70.61 TROCHANTERIC BURSITIS OF RIGHT HIP: ICD-10-CM

## 2025-06-25 DIAGNOSIS — M70.61 GREATER TROCHANTERIC BURSITIS OF RIGHT HIP: Primary | ICD-10-CM

## 2025-06-25 RX ORDER — METHYLPREDNISOLONE ACETATE 40 MG/ML
40 INJECTION, SUSPENSION INTRA-ARTICULAR; INTRALESIONAL; INTRAMUSCULAR; SOFT TISSUE ONCE
Status: COMPLETED | OUTPATIENT
Start: 2025-06-25 | End: 2025-06-25

## 2025-06-25 RX ADMIN — METHYLPREDNISOLONE ACETATE 40 MG: 40 INJECTION, SUSPENSION INTRA-ARTICULAR; INTRALESIONAL; INTRAMUSCULAR; SOFT TISSUE at 11:02

## 2025-06-25 NOTE — PROGRESS NOTES
Patient ID:    Mariluz Rodriguez  [id number]  59 y.o.  1965    Today: June 25, 2025      Chief Complaint: Right hip pain        Patient reports longstanding history of right hip pain.  The pain is predominately localized to the lateral hip and is characterized as a general ache with occasional sharp pain.  They rate the pain ranging from 3-8 on the pain scale occurring in a cyclical fashion with periods of acute exacerbation. Symptoms are exacerbated with attempting to sleep on the hip, attempting to ascend stairs, and sitting for long periods of time which results in lateral hip pain. Patient denies significant anterior groin pain and denies significant issues with attempting to put on socks and shoes or when attempting to get into or out of a vehicle. No numbness of tingling going down the extremity.  Patient has attempted prior conservative treatment including Physical Therapy and Trochanteric Bursitis Injection.      Past Medical History:  Past Medical History:   Diagnosis Date    Atrial fibrillation (HCC)     History of heart murmur in childhood     Resolved per pt    History of pulmonary embolus (PE)     Hx of blood clots     LE    Hyperlipidemia     Morbid obesity (HCC)     MARY (obstructive sleep apnea)     Does not wear CPAP; Awaiting sleep study on 06/16/2025    Ulcerative colitis (HCC)        Past Surgical History:  Past Surgical History:   Procedure Laterality Date    COLONOSCOPY      COLONOSCOPY N/A 5/1/2024    [place]OSCOPY BIOPSY [place]ust[place] [facility] [place]    EDWIN AND BSO (CERVIX REMOVED)      pathology benign        Medications:     Prior to Admission medications    Medication Sig Start Date End Date Taking? Authorizing Provider   sodium-potassium-mag sulfate (SUPREP) 17.5-3.13-1.6 GM/177ML SOLN solution USE AS DIRECTED PER PHYSICIAN INSTRUCTION 6/9/25   Provider, MD Blake   dicyclomine (BENTYL) 20 MG tablet Take 1 tablet by mouth 4 times daily (before meals and

## 2025-07-09 ENCOUNTER — ANESTHESIA EVENT (OUTPATIENT)
Dept: ENDOSCOPY | Age: 60
End: 2025-07-09
Payer: COMMERCIAL

## 2025-07-09 RX ORDER — SODIUM CHLORIDE 9 MG/ML
INJECTION, SOLUTION INTRAVENOUS PRN
Status: CANCELLED | OUTPATIENT
Start: 2025-07-09

## 2025-07-09 RX ORDER — SODIUM CHLORIDE 0.9 % (FLUSH) 0.9 %
5-40 SYRINGE (ML) INJECTION PRN
Status: CANCELLED | OUTPATIENT
Start: 2025-07-09

## 2025-07-09 RX ORDER — SODIUM CHLORIDE 0.9 % (FLUSH) 0.9 %
5-40 SYRINGE (ML) INJECTION EVERY 12 HOURS SCHEDULED
Status: CANCELLED | OUTPATIENT
Start: 2025-07-09

## 2025-07-09 RX ORDER — ONDANSETRON 2 MG/ML
4 INJECTION INTRAMUSCULAR; INTRAVENOUS
Status: CANCELLED | OUTPATIENT
Start: 2025-07-09

## 2025-07-10 ENCOUNTER — HOSPITAL ENCOUNTER (OUTPATIENT)
Age: 60
Setting detail: OUTPATIENT SURGERY
Discharge: HOME | End: 2025-07-10
Attending: INTERNAL MEDICINE | Admitting: INTERNAL MEDICINE
Payer: COMMERCIAL

## 2025-07-10 ENCOUNTER — ANESTHESIA (OUTPATIENT)
Dept: ENDOSCOPY | Age: 60
End: 2025-07-10
Payer: COMMERCIAL

## 2025-07-10 VITALS
DIASTOLIC BLOOD PRESSURE: 88 MMHG | BODY MASS INDEX: 42.11 KG/M2 | HEIGHT: 66 IN | SYSTOLIC BLOOD PRESSURE: 131 MMHG | WEIGHT: 262 LBS | OXYGEN SATURATION: 99 % | RESPIRATION RATE: 19 BRPM | HEART RATE: 79 BPM | TEMPERATURE: 97.5 F

## 2025-07-10 DIAGNOSIS — Z87.19 HISTORY OF IBS: ICD-10-CM

## 2025-07-10 LAB
INR BLD: 1.6 (ref 0.9–1.2)
PT BLD: 15.9 SECS (ref 9.6–11.6)

## 2025-07-10 PROCEDURE — 2580000003 HC RX 258: Performed by: ANESTHESIOLOGY

## 2025-07-10 PROCEDURE — 3609010300 HC COLONOSCOPY W/BIOPSY SINGLE/MULTIPLE: Performed by: INTERNAL MEDICINE

## 2025-07-10 PROCEDURE — 85610 PROTHROMBIN TIME: CPT

## 2025-07-10 PROCEDURE — 45380 COLONOSCOPY AND BIOPSY: CPT | Performed by: INTERNAL MEDICINE

## 2025-07-10 PROCEDURE — 6370000000 HC RX 637 (ALT 250 FOR IP): Performed by: ANESTHESIOLOGY

## 2025-07-10 PROCEDURE — 3700000000 HC ANESTHESIA ATTENDED CARE: Performed by: INTERNAL MEDICINE

## 2025-07-10 PROCEDURE — 2709999900 HC NON-CHARGEABLE SUPPLY: Performed by: INTERNAL MEDICINE

## 2025-07-10 PROCEDURE — 3700000001 HC ADD 15 MINUTES (ANESTHESIA): Performed by: INTERNAL MEDICINE

## 2025-07-10 PROCEDURE — 7100000011 HC PHASE II RECOVERY - ADDTL 15 MIN: Performed by: INTERNAL MEDICINE

## 2025-07-10 PROCEDURE — 7100000010 HC PHASE II RECOVERY - FIRST 15 MIN: Performed by: INTERNAL MEDICINE

## 2025-07-10 PROCEDURE — 6360000002 HC RX W HCPCS: Performed by: NURSE ANESTHETIST, CERTIFIED REGISTERED

## 2025-07-10 PROCEDURE — 88305 TISSUE EXAM BY PATHOLOGIST: CPT

## 2025-07-10 RX ORDER — SODIUM CHLORIDE 0.9 % (FLUSH) 0.9 %
5-40 SYRINGE (ML) INJECTION EVERY 12 HOURS SCHEDULED
Status: DISCONTINUED | OUTPATIENT
Start: 2025-07-10 | End: 2025-07-10 | Stop reason: HOSPADM

## 2025-07-10 RX ORDER — SODIUM CHLORIDE 0.9 % (FLUSH) 0.9 %
5-40 SYRINGE (ML) INJECTION PRN
Status: DISCONTINUED | OUTPATIENT
Start: 2025-07-10 | End: 2025-07-10 | Stop reason: HOSPADM

## 2025-07-10 RX ORDER — ESMOLOL HYDROCHLORIDE 10 MG/ML
INJECTION INTRAVENOUS
Status: DISCONTINUED | OUTPATIENT
Start: 2025-07-10 | End: 2025-07-10 | Stop reason: SDUPTHER

## 2025-07-10 RX ORDER — LIDOCAINE HYDROCHLORIDE 10 MG/ML
1 INJECTION, SOLUTION INFILTRATION; PERINEURAL
Status: DISCONTINUED | OUTPATIENT
Start: 2025-07-10 | End: 2025-07-10 | Stop reason: HOSPADM

## 2025-07-10 RX ORDER — SODIUM CHLORIDE, SODIUM LACTATE, POTASSIUM CHLORIDE, CALCIUM CHLORIDE 600; 310; 30; 20 MG/100ML; MG/100ML; MG/100ML; MG/100ML
INJECTION, SOLUTION INTRAVENOUS CONTINUOUS
Status: DISCONTINUED | OUTPATIENT
Start: 2025-07-10 | End: 2025-07-10 | Stop reason: HOSPADM

## 2025-07-10 RX ORDER — PROPOFOL 10 MG/ML
INJECTION, EMULSION INTRAVENOUS
Status: DISCONTINUED | OUTPATIENT
Start: 2025-07-10 | End: 2025-07-10 | Stop reason: SDUPTHER

## 2025-07-10 RX ORDER — SODIUM CHLORIDE 9 MG/ML
INJECTION, SOLUTION INTRAVENOUS PRN
Status: DISCONTINUED | OUTPATIENT
Start: 2025-07-10 | End: 2025-07-10 | Stop reason: HOSPADM

## 2025-07-10 RX ORDER — LIDOCAINE HYDROCHLORIDE 20 MG/ML
INJECTION, SOLUTION EPIDURAL; INFILTRATION; INTRACAUDAL; PERINEURAL
Status: DISCONTINUED | OUTPATIENT
Start: 2025-07-10 | End: 2025-07-10 | Stop reason: SDUPTHER

## 2025-07-10 RX ORDER — METOPROLOL SUCCINATE 25 MG/1
50 TABLET, EXTENDED RELEASE ORAL
Status: COMPLETED | OUTPATIENT
Start: 2025-07-10 | End: 2025-07-10

## 2025-07-10 RX ORDER — SODIUM CHLORIDE 9 MG/ML
25 INJECTION, SOLUTION INTRAVENOUS PRN
Status: DISCONTINUED | OUTPATIENT
Start: 2025-07-10 | End: 2025-07-10 | Stop reason: HOSPADM

## 2025-07-10 RX ORDER — METOPROLOL TARTRATE 1 MG/ML
INJECTION, SOLUTION INTRAVENOUS
Status: DISCONTINUED | OUTPATIENT
Start: 2025-07-10 | End: 2025-07-10 | Stop reason: SDUPTHER

## 2025-07-10 RX ADMIN — METOPROLOL SUCCINATE 50 MG: 25 TABLET, EXTENDED RELEASE ORAL at 06:49

## 2025-07-10 RX ADMIN — LIDOCAINE HYDROCHLORIDE 60 MG: 20 INJECTION, SOLUTION EPIDURAL; INFILTRATION; INTRACAUDAL; PERINEURAL at 07:46

## 2025-07-10 RX ADMIN — ESMOLOL HYDROCHLORIDE 30 MG: 10 INJECTION INTRAVENOUS at 07:58

## 2025-07-10 RX ADMIN — METOPROLOL TARTRATE 2 MG: 1 INJECTION, SOLUTION INTRAVENOUS at 07:59

## 2025-07-10 RX ADMIN — PROPOFOL 80 MG: 10 INJECTION, EMULSION INTRAVENOUS at 07:46

## 2025-07-10 RX ADMIN — PROPOFOL 150 MCG/KG/MIN: 10 INJECTION, EMULSION INTRAVENOUS at 07:47

## 2025-07-10 RX ADMIN — SODIUM CHLORIDE, SODIUM LACTATE, POTASSIUM CHLORIDE, AND CALCIUM CHLORIDE: 600; 310; 30; 20 INJECTION, SOLUTION INTRAVENOUS at 07:39

## 2025-07-10 ASSESSMENT — PAIN - FUNCTIONAL ASSESSMENT: PAIN_FUNCTIONAL_ASSESSMENT: 0-10

## 2025-07-10 NOTE — PERIOP NOTE
MD Hoffman at bedside with patient. Pt VSS stable. Pain and Nausea controlled at this time. Verbal sign out per MD when pacu care is completed. Plan of care continues.

## 2025-07-10 NOTE — H&P
HISTORY AND PHYSICAL             Date: 7/10/2025        Patient Name: Mariluz Rodriguez     YOB: 1965      Age:  59 y.o.      History of Present Illness   UC follow up     Past Medical History     Past Medical History:   Diagnosis Date    Atrial fibrillation (HCC)     History of heart murmur in childhood     Resolved per pt    History of pulmonary embolus (PE)     Hx of blood clots     LE    Hyperlipidemia     Morbid obesity (HCC)     MARY (obstructive sleep apnea)     Does not wear CPAP; Awaiting sleep study on 06/16/2025    Ulcerative colitis (HCC)         Past Surgical History     Past Surgical History:   Procedure Laterality Date    COLONOSCOPY      COLONOSCOPY N/A 5/1/2024    COLONOSCOPY BIOPSY performed by Liborio Huston MD at [facility] [place]    EDWIN AND BSO (CERVIX REMOVED)      pathology benign        Medications Prior to Admission     Prior to Admission medications    Medication Sig Start Date End Date Taking? Authorizing Provider   sodium-potassium-mag sulfate (SUPREP) 17.5-3.13-1.6 GM/177ML SOLN solution USE AS DIRECTED PER PHYSICIAN INSTRUCTION 6/9/25  Yes Blake Alcaraz MD   dicyclomine (BENTYL) 20 MG tablet Take 1 tablet by mouth 4 times daily (before meals and nightly) 6/6/25  Yes Liborio Huston MD   metoprolol succinate (TOPROL XL) 50 MG extended release tablet Take 1 tablet by mouth once daily 5/19/25  Yes Jae Tripathi MD   mesalamine (LIALDA) 1.2 g EC tablet TAKE 4 TABLETS BY MOUTH ONCE DAILY WITH BREAKFAST (dispense three months supply for insurance) 5/8/25  Yes Liborio Huston MD   valsartan (DIOVAN) 80 MG tablet Take 1 tablet by mouth daily 4/30/25  Yes Musa Dowling DO   atorvastatin (LIPITOR) 40 MG tablet Take 1 tablet by mouth daily 3/4/25  Yes Jae Tripathi MD   mesalamine (CANASA) 1000 MG suppository Place 1 suppository rectally nightly 7/16/24  Yes Liborio Huston MD   warfarin (COUMADIN) 7.5 MG tablet Take 1 tablet by mouth three times a week (M-W-F)

## 2025-07-10 NOTE — ANESTHESIA PRE PROCEDURE
Department of Anesthesiology  Preprocedure Note       Name:  Mariluz Rodriguez   Age:  59 y.o.  :  1965                                          MRN:  [id number]         Date:  7/10/2025      Surgeon: Surgeon(s):  Liborio Huston MD    Procedure: Procedure(s):  COLORECTAL CANCER SCREENING, NOT HIGH RISK    Medications prior to admission:   Prior to Admission medications    Medication Sig Start Date End Date Taking? Authorizing Provider   sodium-potassium-mag sulfate (SUPREP) 17.5-3.13-1.6 GM/177ML SOLN solution USE AS DIRECTED PER PHYSICIAN INSTRUCTION 25  Yes Blake Alcaraz MD   dicyclomine (BENTYL) 20 MG tablet Take 1 tablet by mouth 4 times daily (before meals and nightly) 25  Yes Liborio Huston MD   metoprolol succinate (TOPROL XL) 50 MG extended release tablet Take 1 tablet by mouth once daily 25  Yes Jae Tripathi MD   mesalamine (LIALDA) 1.2 g EC tablet TAKE 4 TABLETS BY MOUTH ONCE DAILY WITH BREAKFAST (dispense three months supply for insurance) 25  Yes Liborio Huston MD   valsartan (DIOVAN) 80 MG tablet Take 1 tablet by mouth daily 25  Yes Musa Dowling DO   atorvastatin (LIPITOR) 40 MG tablet Take 1 tablet by mouth daily 3/4/25  Yes Jae Tripathi MD   mesalamine (CANASA) 1000 MG suppository Place 1 suppository rectally nightly 24  Yes Liborio Huston MD   warfarin (COUMADIN) 7.5 MG tablet Take 1 tablet by mouth three times a week (-W-) 23  Yes Blake Alcaraz MD   warfarin (COUMADIN) 5 MG tablet Take 1 tablet by mouth once daily 25   Jae Tripathi MD       Current medications:    Current Facility-Administered Medications   Medication Dose Route Frequency Provider Last Rate Last Admin   • lidocaine 1 % injection 1 mL  1 mL IntraDERmal Once PRN BETTY Hoffman MD       • lactated ringers infusion   IntraVENous Continuous BETTY Hoffman MD       • sodium chloride flush 0.9 % injection 5-40 mL  5-40 mL IntraVENous 2 times per

## 2025-07-10 NOTE — DISCHARGE INSTRUCTIONS
Gastrointestinal Colonoscopy/Flexible Sigmoidoscopy - Lower Exam Discharge Instructions  Call Dr. Huston for any problems or questions.  Contact the doctor’s office for follow up appointment as directed  Medication may cause drowsiness for several hours, therefore, do not drive or operate machinery for remainder of the day.  No alcohol today.  Do not make any important decisions such signing legal paperwork.  Ordinarily, you may resume regular diet and activity after exam unless otherwise specified by your physician.  Because of air put into your colon during exam, you may experience some abdominal distension, relieved by the passage of gas, for several hours.  Contact your physician if you have any of the following:  Excessive amount of bleeding - large amount when having a bowel movement.  Occasional specks of blood with bowel movement would not be unusual.  Severe abdominal pain  Fever or Chills  Polyp Removal - follow these additional instructions  Clear liquid diet for the next meal (jell-o, broth, clear drinks)  Soft diet for 24 hours, then resume regular diet   Take Metamucil - 1 tablespoon in juice every morning for 3 days, if needed.  No Aspirin, Advil, Aleve, Nuprin, Ibuprofen, or medications that contain these drugs for 2 weeks, unless taken for a heart condition.

## 2025-07-10 NOTE — ANESTHESIA POSTPROCEDURE EVALUATION
Department of Anesthesiology  Postprocedure Note    Patient: Mariluz Rodriguez  MRN: [id number]  YOB: 1965  Date of evaluation: 7/10/2025    Procedure Summary       Date: 07/10/25 Room / Location: [facility] ENDO 01 / [facility]    Anesthesia Start: 0739 Anesthesia Stop: 0809    Procedure: COLONOSCOPY BIOPSY (Lower GI Region) Diagnosis:       History of IBS      (History of IBS [Z87.19])    Surgeons: Liborio Huston MD Responsible Provider: BETTY Hoffman MD    Anesthesia Type: TIVA ASA Status: 3            Anesthesia Type: No value filed.    Janet Phase I:      Janet Phase II: Janet Score: 10    Anesthesia Post Evaluation    Patient location during evaluation: PACU  Patient participation: complete - patient participated  Level of consciousness: awake and alert  Airway patency: patent  Nausea & Vomiting: no nausea and no vomiting  Cardiovascular status: hemodynamically stable  Respiratory status: acceptable  Hydration status: euvolemic  Comments: Blood pressure 131/88, pulse 79, temperature 97.5 °F (36.4 °C), temperature source Skin, resp. rate 19, height 1.676 m (5' 6\"), weight 118.8 kg (262 lb), SpO2 99%.    No apparent anesthetic complications.  Pt stable for discharge from PACU  Multimodal analgesia pain management approach  Pain management: adequate    No notable events documented.

## 2025-07-18 DIAGNOSIS — I48.11 LONGSTANDING PERSISTENT ATRIAL FIBRILLATION (HCC): ICD-10-CM

## 2025-07-18 DIAGNOSIS — Z86.711 HISTORY OF PULMONARY EMBOLUS (PE): ICD-10-CM

## 2025-07-18 DIAGNOSIS — Z79.01 LONG TERM CURRENT USE OF ANTICOAGULANT: ICD-10-CM

## 2025-07-18 LAB
INR PPP: 2.8
PROTHROMBIN TIME: 31.2 SEC (ref 11.3–14.9)

## 2025-07-21 ENCOUNTER — RESULTS FOLLOW-UP (OUTPATIENT)
Dept: INTERNAL MEDICINE CLINIC | Facility: CLINIC | Age: 60
End: 2025-07-21

## 2025-07-21 NOTE — RESULT ENCOUNTER NOTE
Notify patient INR is therapeutic on current dose of warfarin.  Continue same.  Repeat INR one month.

## 2025-07-22 NOTE — TELEPHONE ENCOUNTER
----- Message from Dr. Jae Tripathi MD sent at 7/21/2025  7:30 PM EDT -----  Notify patient INR is therapeutic on current dose of warfarin.  Continue same.  Repeat INR one month.

## 2025-07-24 DIAGNOSIS — R19.7 DIARRHEA, UNSPECIFIED TYPE: Primary | ICD-10-CM

## 2025-07-24 DIAGNOSIS — R10.9 ABDOMINAL PAIN, UNSPECIFIED ABDOMINAL LOCATION: ICD-10-CM

## 2025-07-24 RX ORDER — DIPHENOXYLATE HYDROCHLORIDE AND ATROPINE SULFATE 2.5; .025 MG/1; MG/1
1 TABLET ORAL 4 TIMES DAILY PRN
Qty: 60 TABLET | Refills: 1 | Status: SHIPPED | OUTPATIENT
Start: 2025-07-24 | End: 2025-08-24

## 2025-08-12 DIAGNOSIS — E78.00 HYPERCHOLESTEROLEMIA: ICD-10-CM

## 2025-08-12 RX ORDER — ATORVASTATIN CALCIUM 40 MG/1
40 TABLET, FILM COATED ORAL DAILY
Qty: 90 TABLET | Refills: 0 | Status: SHIPPED | OUTPATIENT
Start: 2025-08-12

## 2025-08-14 ENCOUNTER — OFFICE VISIT (OUTPATIENT)
Dept: INTERNAL MEDICINE CLINIC | Facility: CLINIC | Age: 60
End: 2025-08-14

## 2025-08-14 ENCOUNTER — RESULTS FOLLOW-UP (OUTPATIENT)
Dept: INTERNAL MEDICINE CLINIC | Facility: CLINIC | Age: 60
End: 2025-08-14

## 2025-08-14 VITALS
SYSTOLIC BLOOD PRESSURE: 116 MMHG | HEART RATE: 97 BPM | WEIGHT: 268 LBS | DIASTOLIC BLOOD PRESSURE: 78 MMHG | OXYGEN SATURATION: 98 % | HEIGHT: 66 IN | BODY MASS INDEX: 43.07 KG/M2

## 2025-08-14 DIAGNOSIS — I48.11 LONGSTANDING PERSISTENT ATRIAL FIBRILLATION (HCC): ICD-10-CM

## 2025-08-14 DIAGNOSIS — Z86.711 HISTORY OF PULMONARY EMBOLUS (PE): ICD-10-CM

## 2025-08-14 DIAGNOSIS — K51.919 ULCERATIVE COLITIS WITH COMPLICATION, UNSPECIFIED LOCATION (HCC): Primary | ICD-10-CM

## 2025-08-14 DIAGNOSIS — Z79.01 LONG TERM CURRENT USE OF ANTICOAGULANT: ICD-10-CM

## 2025-08-14 DIAGNOSIS — E78.00 HYPERCHOLESTEROLEMIA: ICD-10-CM

## 2025-08-14 DIAGNOSIS — G47.33 OSA (OBSTRUCTIVE SLEEP APNEA): ICD-10-CM

## 2025-08-14 DIAGNOSIS — Z12.31 ENCOUNTER FOR SCREENING MAMMOGRAM FOR MALIGNANT NEOPLASM OF BREAST: Chronic | ICD-10-CM

## 2025-08-14 PROBLEM — Z01.818 ENCOUNTER FOR PREOPERATIVE ASSESSMENT: Status: RESOLVED | Noted: 2025-06-11 | Resolved: 2025-08-14

## 2025-08-14 LAB
INR PPP: 2.9
PROTHROMBIN TIME: 30.4 SEC (ref 11.3–14.9)

## 2025-08-14 PROCEDURE — 99214 OFFICE O/P EST MOD 30 MIN: CPT | Performed by: INTERNAL MEDICINE

## 2025-08-14 ASSESSMENT — ENCOUNTER SYMPTOMS
RECTAL PAIN: 0
VOICE CHANGE: 0
STRIDOR: 0
EYE PAIN: 0

## 2025-08-15 RX ORDER — METOPROLOL SUCCINATE 50 MG/1
50 TABLET, EXTENDED RELEASE ORAL DAILY
Qty: 90 TABLET | Refills: 3 | Status: SHIPPED | OUTPATIENT
Start: 2025-08-15

## 2025-08-29 DIAGNOSIS — I48.11 LONGSTANDING PERSISTENT ATRIAL FIBRILLATION (HCC): ICD-10-CM

## 2025-08-29 DIAGNOSIS — Z86.711 HISTORY OF PULMONARY EMBOLUS (PE): ICD-10-CM

## 2025-08-29 RX ORDER — WARFARIN SODIUM 5 MG/1
5 TABLET ORAL DAILY
Qty: 90 TABLET | Refills: 0 | Status: SHIPPED | OUTPATIENT
Start: 2025-08-29

## (undated) DEVICE — SYRINGE MED 10ML LUERLOCK TIP W/O SFTY DISP

## (undated) DEVICE — ENDOSCOPIC KIT 1.1+ OP4 CA DE 2 GWN AAMI LEVEL 3

## (undated) DEVICE — FORCEPS BX L240CM JAW DIA2.8MM L CAP W/ NDL MIC MESH TOOTH

## (undated) DEVICE — KENDALL RADIOLUCENT FOAM MONITORING ELECTRODE RECTANGULAR SHAPE: Brand: KENDALL

## (undated) DEVICE — SYRINGE MEDICAL 3ML CLEAR PLASTIC STANDARD NON CONTROL LUERLOCK TIP DISPOSABLE

## (undated) DEVICE — SYRINGE, LUER SLIP, STERILE, 60ML: Brand: MEDLINE

## (undated) DEVICE — CONNECTOR TBNG OD5-7MM O2 END DISP

## (undated) DEVICE — YANKAUER,BULB TIP,W/O VENT,RIGID,STERILE: Brand: MEDLINE

## (undated) DEVICE — SOLUTION IRRIG 1000ML H2O PIC PLAS SHATTERPROOF CONTAINER

## (undated) DEVICE — GAUZE,SPONGE,4"X4",12PLY,WOVEN,NS,LF: Brand: MEDLINE

## (undated) DEVICE — AIRLIFE™ OXYGEN TUBING 7 FEET (2.1 M) CRUSH RESISTANT OXYGEN TUBING, VINYL TIPPED: Brand: AIRLIFE™

## (undated) DEVICE — NEEDLE SYR 18GA L1.5IN RED PLAS HUB S STL BLNT FILL W/O

## (undated) DEVICE — CONTAINER FORMALIN PREFILLED 10% NBF 60ML

## (undated) DEVICE — SINGLE PORT MANIFOLD: Brand: NEPTUNE 2

## (undated) DEVICE — CANNULA NSL ORAL AD FOR CAPNOFLEX CO2 O2 AIRLFE

## (undated) DEVICE — DISPOSABLE BIOPSY VALVE MAJ-1555: Brand: SINGLE USE BIOPSY VALVE (STERILE)

## (undated) DEVICE — NEEDLE SYRINGE 18GA L1.5IN RED PLAS HUB S STL BLNT FILL W/O